# Patient Record
Sex: MALE | Race: BLACK OR AFRICAN AMERICAN | NOT HISPANIC OR LATINO | Employment: FULL TIME | RURAL
[De-identification: names, ages, dates, MRNs, and addresses within clinical notes are randomized per-mention and may not be internally consistent; named-entity substitution may affect disease eponyms.]

---

## 2020-10-01 ENCOUNTER — HISTORICAL (OUTPATIENT)
Dept: ADMINISTRATIVE | Facility: HOSPITAL | Age: 65
End: 2020-10-01

## 2020-10-02 LAB

## 2021-05-12 ENCOUNTER — TELEPHONE (OUTPATIENT)
Dept: PRIMARY CARE CLINIC | Facility: CLINIC | Age: 66
End: 2021-05-12

## 2021-05-12 RX ORDER — AMLODIPINE BESYLATE 5 MG/1
5 TABLET ORAL DAILY
Qty: 90 TABLET | Refills: 3 | Status: SHIPPED | OUTPATIENT
Start: 2021-05-12 | End: 2022-04-19

## 2021-05-12 RX ORDER — AMLODIPINE BESYLATE 5 MG/1
5 TABLET ORAL DAILY
COMMUNITY
End: 2021-05-12 | Stop reason: SDUPTHER

## 2021-05-14 ENCOUNTER — OFFICE VISIT (OUTPATIENT)
Dept: PRIMARY CARE CLINIC | Facility: CLINIC | Age: 66
End: 2021-05-14
Payer: COMMERCIAL

## 2021-05-14 VITALS
WEIGHT: 168 LBS | DIASTOLIC BLOOD PRESSURE: 100 MMHG | TEMPERATURE: 98 F | SYSTOLIC BLOOD PRESSURE: 160 MMHG | BODY MASS INDEX: 25.46 KG/M2 | OXYGEN SATURATION: 100 % | RESPIRATION RATE: 16 BRPM | HEIGHT: 68 IN | HEART RATE: 87 BPM

## 2021-05-14 DIAGNOSIS — M79.642 HAND PAIN, LEFT: ICD-10-CM

## 2021-05-14 DIAGNOSIS — I10 ESSENTIAL HYPERTENSION: Primary | ICD-10-CM

## 2021-05-14 PROCEDURE — 99213 OFFICE O/P EST LOW 20 MIN: CPT | Mod: ,,, | Performed by: NURSE PRACTITIONER

## 2021-05-14 PROCEDURE — 99213 PR OFFICE/OUTPT VISIT, EST, LEVL III, 20-29 MIN: ICD-10-PCS | Mod: ,,, | Performed by: NURSE PRACTITIONER

## 2021-05-14 RX ORDER — CETIRIZINE HYDROCHLORIDE 10 MG/1
10 TABLET ORAL DAILY
COMMUNITY
End: 2023-03-07 | Stop reason: SDUPTHER

## 2021-05-14 RX ORDER — DICLOFENAC SODIUM 10 MG/G
2 GEL TOPICAL 3 TIMES DAILY PRN
Qty: 100 G | Refills: 3 | Status: SHIPPED | OUTPATIENT
Start: 2021-05-14 | End: 2023-03-07 | Stop reason: SDUPTHER

## 2021-05-18 PROBLEM — M79.642 HAND PAIN, LEFT: Status: ACTIVE | Noted: 2021-05-18

## 2023-03-07 ENCOUNTER — OFFICE VISIT (OUTPATIENT)
Dept: INTERNAL MEDICINE | Facility: CLINIC | Age: 68
End: 2023-03-07
Payer: COMMERCIAL

## 2023-03-07 VITALS
BODY MASS INDEX: 24.86 KG/M2 | DIASTOLIC BLOOD PRESSURE: 90 MMHG | HEIGHT: 68 IN | HEART RATE: 102 BPM | WEIGHT: 164 LBS | TEMPERATURE: 98 F | SYSTOLIC BLOOD PRESSURE: 138 MMHG | OXYGEN SATURATION: 97 % | RESPIRATION RATE: 16 BRPM

## 2023-03-07 DIAGNOSIS — M15.9 OSTEOARTHRITIS OF MULTIPLE JOINTS, UNSPECIFIED OSTEOARTHRITIS TYPE: ICD-10-CM

## 2023-03-07 DIAGNOSIS — I10 ESSENTIAL HYPERTENSION: Primary | ICD-10-CM

## 2023-03-07 DIAGNOSIS — J30.2 SEASONAL ALLERGIES: ICD-10-CM

## 2023-03-07 PROCEDURE — 99203 OFFICE O/P NEW LOW 30 MIN: CPT | Mod: S$PBB,,, | Performed by: INTERNAL MEDICINE

## 2023-03-07 PROCEDURE — 99203 PR OFFICE/OUTPT VISIT, NEW, LEVL III, 30-44 MIN: ICD-10-PCS | Mod: S$PBB,,, | Performed by: INTERNAL MEDICINE

## 2023-03-07 PROCEDURE — 99214 OFFICE O/P EST MOD 30 MIN: CPT | Mod: PBBFAC | Performed by: INTERNAL MEDICINE

## 2023-03-07 RX ORDER — MELOXICAM 15 MG/1
15 TABLET ORAL DAILY
Qty: 90 TABLET | Refills: 3 | Status: SHIPPED | OUTPATIENT
Start: 2023-03-07 | End: 2024-02-23

## 2023-03-07 RX ORDER — CETIRIZINE HYDROCHLORIDE 10 MG/1
10 TABLET ORAL DAILY
Qty: 90 TABLET | Refills: 3 | Status: SHIPPED | OUTPATIENT
Start: 2023-03-07

## 2023-03-07 RX ORDER — AMLODIPINE BESYLATE 5 MG/1
5 TABLET ORAL DAILY
Qty: 90 TABLET | Refills: 3 | Status: SHIPPED | OUTPATIENT
Start: 2023-03-07 | End: 2024-02-23

## 2023-03-07 RX ORDER — DICLOFENAC SODIUM 10 MG/G
2 GEL TOPICAL 3 TIMES DAILY PRN
Qty: 100 G | Refills: 3 | Status: SHIPPED | OUTPATIENT
Start: 2023-03-07

## 2023-03-07 NOTE — PROGRESS NOTES
Subjective:       Patient ID: Isidoro Martinez is a 67 y.o. male.    Chief Complaint: Nasal Congestion (allergies) and Establish Care    Needs new pcp  doing well  seasonal allergies  Review of Systems   Constitutional:  Negative for appetite change, fatigue and unexpected weight change.   HENT:  Negative for postnasal drip, trouble swallowing and goiter.    Eyes:  Negative for visual disturbance.   Respiratory:  Negative for apnea, choking and chest tightness.    Cardiovascular:  Negative for chest pain and leg swelling.   Gastrointestinal:  Negative for blood in stool, change in bowel habit and change in bowel habit.   Genitourinary:  Negative for difficulty urinating and frequency.   Musculoskeletal:  Negative for arthralgias, back pain and leg pain.   Integumentary:  Negative for rash.   Neurological:  Negative for coordination difficulties, memory loss and coordination difficulties.   Hematological:  Negative for adenopathy.   Psychiatric/Behavioral:  Negative for agitation. The patient is not hyperactive.        Objective:      Physical Exam  Vitals and nursing note reviewed.   Constitutional:       Appearance: Normal appearance. He is obese.   HENT:      Head: Normocephalic and atraumatic.      Right Ear: Tympanic membrane, ear canal and external ear normal.      Left Ear: Tympanic membrane, ear canal and external ear normal.      Nose: Nose normal.      Mouth/Throat:      Mouth: Mucous membranes are moist.      Pharynx: Oropharynx is clear.   Eyes:      Extraocular Movements: Extraocular movements intact.      Conjunctiva/sclera: Conjunctivae normal.      Pupils: Pupils are equal, round, and reactive to light.   Cardiovascular:      Rate and Rhythm: Normal rate and regular rhythm.      Pulses: Normal pulses.      Heart sounds: Normal heart sounds.   Pulmonary:      Effort: Pulmonary effort is normal.      Breath sounds: Normal breath sounds.   Abdominal:      General: Abdomen is flat. Bowel sounds are normal.       Palpations: Abdomen is soft.   Musculoskeletal:         General: Normal range of motion.      Cervical back: Normal range of motion.   Skin:     General: Skin is warm and dry.      Capillary Refill: Capillary refill takes less than 2 seconds.   Neurological:      General: No focal deficit present.      Mental Status: He is alert and oriented to person, place, and time.   Psychiatric:         Mood and Affect: Mood normal.         Behavior: Behavior normal.         Thought Content: Thought content normal.         Judgment: Judgment normal.       Assessment:       1. Essential hypertension    2. Osteoarthritis of multiple joints, unspecified osteoarthritis type    3. Seasonal allergies          Plan:         Patient Instructions   Continue current meds and f/u 3 months      Problem List Items Addressed This Visit    None  Visit Diagnoses       Essential hypertension    -  Primary    Osteoarthritis of multiple joints, unspecified osteoarthritis type        Seasonal allergies

## 2023-05-09 DIAGNOSIS — Z71.89 COMPLEX CARE COORDINATION: ICD-10-CM

## 2023-12-09 DIAGNOSIS — Z71.89 COMPLEX CARE COORDINATION: ICD-10-CM

## 2024-02-22 DIAGNOSIS — I10 ESSENTIAL HYPERTENSION: ICD-10-CM

## 2024-02-22 DIAGNOSIS — M15.9 OSTEOARTHRITIS OF MULTIPLE JOINTS, UNSPECIFIED OSTEOARTHRITIS TYPE: ICD-10-CM

## 2024-02-23 RX ORDER — MELOXICAM 15 MG/1
15 TABLET ORAL
Qty: 90 TABLET | Refills: 3 | Status: SHIPPED | OUTPATIENT
Start: 2024-02-23

## 2024-02-23 RX ORDER — AMLODIPINE BESYLATE 5 MG/1
5 TABLET ORAL
Qty: 90 TABLET | Refills: 3 | Status: SHIPPED | OUTPATIENT
Start: 2024-02-23

## 2024-11-26 ENCOUNTER — HOSPITAL ENCOUNTER (OUTPATIENT)
Dept: CARDIOLOGY | Facility: HOSPITAL | Age: 69
Discharge: HOME | End: 2024-11-26
Payer: MEDICARE

## 2024-11-26 DIAGNOSIS — I10 ESSENTIAL HYPERTENSION, MALIGNANT: ICD-10-CM

## 2024-11-26 DIAGNOSIS — I10 ESSENTIAL HYPERTENSION, MALIGNANT: Primary | ICD-10-CM

## 2024-11-26 PROCEDURE — 93005 ELECTROCARDIOGRAM TRACING: CPT

## 2024-12-18 ENCOUNTER — HOSPITAL ENCOUNTER (INPATIENT)
Facility: HOSPITAL | Age: 69
LOS: 5 days | Discharge: HOME | DRG: 871 | End: 2024-12-23
Attending: EMERGENCY MEDICINE | Admitting: INTERNAL MEDICINE
Payer: MEDICARE

## 2024-12-18 ENCOUNTER — OFFICE VISIT (OUTPATIENT)
Dept: FAMILY MEDICINE | Facility: CLINIC | Age: 69
End: 2024-12-18
Payer: MEDICARE

## 2024-12-18 VITALS
RESPIRATION RATE: 22 BRPM | WEIGHT: 168 LBS | HEIGHT: 68 IN | OXYGEN SATURATION: 96 % | BODY MASS INDEX: 25.46 KG/M2 | SYSTOLIC BLOOD PRESSURE: 131 MMHG | DIASTOLIC BLOOD PRESSURE: 93 MMHG | HEART RATE: 116 BPM | TEMPERATURE: 97 F

## 2024-12-18 DIAGNOSIS — J18.9 PNEUMONIA OF LEFT LUNG DUE TO INFECTIOUS ORGANISM, UNSPECIFIED PART OF LUNG: ICD-10-CM

## 2024-12-18 DIAGNOSIS — R05.9 COUGH, UNSPECIFIED TYPE: ICD-10-CM

## 2024-12-18 DIAGNOSIS — J90 PLEURAL EFFUSION: Primary | ICD-10-CM

## 2024-12-18 DIAGNOSIS — R07.9 CHEST PAIN: ICD-10-CM

## 2024-12-18 DIAGNOSIS — R06.02 SHORTNESS OF BREATH: ICD-10-CM

## 2024-12-18 PROBLEM — F10.20 ALCOHOL DEPENDENCY: Status: ACTIVE | Noted: 2024-12-18

## 2024-12-18 LAB
ALBUMIN SERPL BCP-MCNC: 3.2 G/DL (ref 3.4–4.8)
ALBUMIN/GLOB SERPL: 0.7 {RATIO}
ALP SERPL-CCNC: 75 U/L (ref 40–150)
ALT SERPL W P-5'-P-CCNC: 27 U/L
ANION GAP SERPL CALCULATED.3IONS-SCNC: 18 MMOL/L (ref 7–16)
AST SERPL W P-5'-P-CCNC: 31 U/L (ref 5–34)
BASOPHILS # BLD AUTO: 0.04 K/UL (ref 0–0.2)
BASOPHILS NFR BLD AUTO: 0.2 % (ref 0–1)
BILIRUB SERPL-MCNC: 0.4 MG/DL
BUN SERPL-MCNC: 9 MG/DL (ref 8–26)
BUN/CREAT SERPL: 11 (ref 6–20)
CALCIUM SERPL-MCNC: 9.4 MG/DL (ref 8.8–10)
CHLORIDE SERPL-SCNC: 102 MMOL/L (ref 98–107)
CO2 SERPL-SCNC: 17 MMOL/L (ref 23–31)
CREAT SERPL-MCNC: 0.79 MG/DL (ref 0.72–1.25)
DIFFERENTIAL METHOD BLD: ABNORMAL
EGFR (NO RACE VARIABLE) (RUSH/TITUS): 97 ML/MIN/1.73M2
EOSINOPHIL # BLD AUTO: 0.07 K/UL (ref 0–0.5)
EOSINOPHIL NFR BLD AUTO: 0.4 % (ref 1–4)
ERYTHROCYTE [DISTWIDTH] IN BLOOD BY AUTOMATED COUNT: 12.9 % (ref 11.5–14.5)
ETHANOL SERPL-MCNC: <10 MG/DL
GLOBULIN SER-MCNC: 4.7 G/DL (ref 2–4)
GLUCOSE SERPL-MCNC: 154 MG/DL (ref 82–115)
HCT VFR BLD AUTO: 50.1 % (ref 40–54)
HGB BLD-MCNC: 16.5 G/DL (ref 13.5–18)
IMM GRANULOCYTES # BLD AUTO: 0.15 K/UL (ref 0–0.04)
IMM GRANULOCYTES NFR BLD: 0.9 % (ref 0–0.4)
LYMPHOCYTES # BLD AUTO: 1.69 K/UL (ref 1–4.8)
LYMPHOCYTES NFR BLD AUTO: 10.1 % (ref 27–41)
MCH RBC QN AUTO: 30.2 PG (ref 27–31)
MCHC RBC AUTO-ENTMCNC: 32.9 G/DL (ref 32–36)
MCV RBC AUTO: 91.6 FL (ref 80–96)
MONOCYTES # BLD AUTO: 2.18 K/UL (ref 0–0.8)
MONOCYTES NFR BLD AUTO: 13 % (ref 2–6)
MPC BLD CALC-MCNC: 9.1 FL (ref 9.4–12.4)
NEUTROPHILS # BLD AUTO: 12.61 K/UL (ref 1.8–7.7)
NEUTROPHILS NFR BLD AUTO: 75.4 % (ref 53–65)
NRBC # BLD AUTO: 0 X10E3/UL
NRBC, AUTO (.00): 0 %
NT-PROBNP SERPL-MCNC: 198 PG/ML (ref 1–125)
PLATELET # BLD AUTO: 524 K/UL (ref 150–400)
POTASSIUM SERPL-SCNC: 4.2 MMOL/L (ref 3.5–5.1)
PROT SERPL-MCNC: 7.9 G/DL (ref 5.8–7.6)
RBC # BLD AUTO: 5.47 M/UL (ref 4.6–6.2)
SODIUM SERPL-SCNC: 133 MMOL/L (ref 136–145)
TROPONIN I SERPL HS-MCNC: <2.7 NG/L
TSH SERPL DL<=0.005 MIU/L-ACNC: 1.32 UIU/ML (ref 0.35–4.94)
WBC # BLD AUTO: 16.74 K/UL (ref 4.5–11)

## 2024-12-18 PROCEDURE — 36415 COLL VENOUS BLD VENIPUNCTURE: CPT | Performed by: EMERGENCY MEDICINE

## 2024-12-18 PROCEDURE — 3008F BODY MASS INDEX DOCD: CPT | Mod: ,,, | Performed by: FAMILY MEDICINE

## 2024-12-18 PROCEDURE — 83880 ASSAY OF NATRIURETIC PEPTIDE: CPT | Performed by: EMERGENCY MEDICINE

## 2024-12-18 PROCEDURE — 96372 THER/PROPH/DIAG INJ SC/IM: CPT | Mod: ,,, | Performed by: FAMILY MEDICINE

## 2024-12-18 PROCEDURE — 25000003 PHARM REV CODE 250: Performed by: NURSE PRACTITIONER

## 2024-12-18 PROCEDURE — 93005 ELECTROCARDIOGRAM TRACING: CPT

## 2024-12-18 PROCEDURE — 82077 ASSAY SPEC XCP UR&BREATH IA: CPT | Performed by: NURSE PRACTITIONER

## 2024-12-18 PROCEDURE — 11000001 HC ACUTE MED/SURG PRIVATE ROOM

## 2024-12-18 PROCEDURE — 80053 COMPREHEN METABOLIC PANEL: CPT | Performed by: EMERGENCY MEDICINE

## 2024-12-18 PROCEDURE — 99213 OFFICE O/P EST LOW 20 MIN: CPT | Mod: 25,,, | Performed by: FAMILY MEDICINE

## 2024-12-18 PROCEDURE — 25500020 PHARM REV CODE 255: Performed by: INTERNAL MEDICINE

## 2024-12-18 PROCEDURE — 85025 COMPLETE CBC W/AUTO DIFF WBC: CPT | Performed by: EMERGENCY MEDICINE

## 2024-12-18 PROCEDURE — 3288F FALL RISK ASSESSMENT DOCD: CPT | Mod: ,,, | Performed by: FAMILY MEDICINE

## 2024-12-18 PROCEDURE — 63600175 PHARM REV CODE 636 W HCPCS: Performed by: INTERNAL MEDICINE

## 2024-12-18 PROCEDURE — 1101F PT FALLS ASSESS-DOCD LE1/YR: CPT | Mod: ,,, | Performed by: FAMILY MEDICINE

## 2024-12-18 PROCEDURE — 36415 COLL VENOUS BLD VENIPUNCTURE: CPT | Performed by: NURSE PRACTITIONER

## 2024-12-18 PROCEDURE — 93010 ELECTROCARDIOGRAM REPORT: CPT | Mod: ,,, | Performed by: STUDENT IN AN ORGANIZED HEALTH CARE EDUCATION/TRAINING PROGRAM

## 2024-12-18 PROCEDURE — 3044F HG A1C LEVEL LT 7.0%: CPT | Mod: ,,, | Performed by: FAMILY MEDICINE

## 2024-12-18 PROCEDURE — 25000242 PHARM REV CODE 250 ALT 637 W/ HCPCS: Performed by: NURSE PRACTITIONER

## 2024-12-18 PROCEDURE — 25000003 PHARM REV CODE 250: Performed by: INTERNAL MEDICINE

## 2024-12-18 PROCEDURE — 3075F SYST BP GE 130 - 139MM HG: CPT | Mod: ,,, | Performed by: FAMILY MEDICINE

## 2024-12-18 PROCEDURE — 1159F MED LIST DOCD IN RCRD: CPT | Mod: ,,, | Performed by: FAMILY MEDICINE

## 2024-12-18 PROCEDURE — 3080F DIAST BP >= 90 MM HG: CPT | Mod: ,,, | Performed by: FAMILY MEDICINE

## 2024-12-18 PROCEDURE — 1160F RVW MEDS BY RX/DR IN RCRD: CPT | Mod: ,,, | Performed by: FAMILY MEDICINE

## 2024-12-18 PROCEDURE — 84484 ASSAY OF TROPONIN QUANT: CPT | Performed by: EMERGENCY MEDICINE

## 2024-12-18 PROCEDURE — 63600175 PHARM REV CODE 636 W HCPCS: Performed by: NURSE PRACTITIONER

## 2024-12-18 PROCEDURE — 99285 EMERGENCY DEPT VISIT HI MDM: CPT | Mod: 25

## 2024-12-18 RX ORDER — AMLODIPINE BESYLATE 10 MG/1
10 TABLET ORAL DAILY
Status: DISCONTINUED | OUTPATIENT
Start: 2024-12-19 | End: 2024-12-21

## 2024-12-18 RX ORDER — IOPAMIDOL 755 MG/ML
100 INJECTION, SOLUTION INTRAVASCULAR
Status: COMPLETED | OUTPATIENT
Start: 2024-12-18 | End: 2024-12-18

## 2024-12-18 RX ORDER — CEFTRIAXONE 500 MG/1
500 INJECTION, POWDER, FOR SOLUTION INTRAMUSCULAR; INTRAVENOUS
Status: DISCONTINUED | OUTPATIENT
Start: 2024-12-18 | End: 2024-12-18 | Stop reason: HOSPADM

## 2024-12-18 RX ORDER — ACETAMINOPHEN 325 MG/1
650 TABLET ORAL EVERY 4 HOURS PRN
Status: DISCONTINUED | OUTPATIENT
Start: 2024-12-18 | End: 2024-12-19

## 2024-12-18 RX ORDER — AMOXICILLIN 250 MG
1 CAPSULE ORAL 2 TIMES DAILY
Status: DISCONTINUED | OUTPATIENT
Start: 2024-12-18 | End: 2024-12-23 | Stop reason: HOSPADM

## 2024-12-18 RX ORDER — ONDANSETRON HYDROCHLORIDE 2 MG/ML
4 INJECTION, SOLUTION INTRAVENOUS EVERY 8 HOURS PRN
Status: DISCONTINUED | OUTPATIENT
Start: 2024-12-18 | End: 2024-12-19

## 2024-12-18 RX ORDER — LORAZEPAM 2 MG/ML
2 INJECTION INTRAMUSCULAR
Status: DISCONTINUED | OUTPATIENT
Start: 2024-12-18 | End: 2024-12-23 | Stop reason: HOSPADM

## 2024-12-18 RX ORDER — IBUPROFEN 200 MG
16 TABLET ORAL
Status: DISCONTINUED | OUTPATIENT
Start: 2024-12-18 | End: 2024-12-19

## 2024-12-18 RX ORDER — HYDROCODONE BITARTRATE AND ACETAMINOPHEN 5; 325 MG/1; MG/1
1 TABLET ORAL EVERY 6 HOURS PRN
Status: DISCONTINUED | OUTPATIENT
Start: 2024-12-18 | End: 2024-12-23 | Stop reason: HOSPADM

## 2024-12-18 RX ORDER — POLYETHYLENE GLYCOL 3350 17 G/17G
17 POWDER, FOR SOLUTION ORAL DAILY
Status: DISCONTINUED | OUTPATIENT
Start: 2024-12-19 | End: 2024-12-23 | Stop reason: HOSPADM

## 2024-12-18 RX ORDER — IBUPROFEN 200 MG
24 TABLET ORAL
Status: DISCONTINUED | OUTPATIENT
Start: 2024-12-18 | End: 2024-12-19

## 2024-12-18 RX ORDER — AMLODIPINE BESYLATE 10 MG/1
10 TABLET ORAL DAILY
COMMUNITY
Start: 2024-11-22

## 2024-12-18 RX ORDER — MORPHINE SULFATE 4 MG/ML
4 INJECTION, SOLUTION INTRAMUSCULAR; INTRAVENOUS EVERY 4 HOURS PRN
Status: DISCONTINUED | OUTPATIENT
Start: 2024-12-18 | End: 2024-12-23 | Stop reason: HOSPADM

## 2024-12-18 RX ORDER — IPRATROPIUM BROMIDE AND ALBUTEROL SULFATE 2.5; .5 MG/3ML; MG/3ML
3 SOLUTION RESPIRATORY (INHALATION) EVERY 6 HOURS
Status: DISCONTINUED | OUTPATIENT
Start: 2024-12-18 | End: 2024-12-23 | Stop reason: HOSPADM

## 2024-12-18 RX ORDER — DEXAMETHASONE SODIUM PHOSPHATE 4 MG/ML
4 INJECTION, SOLUTION INTRA-ARTICULAR; INTRALESIONAL; INTRAMUSCULAR; INTRAVENOUS; SOFT TISSUE
Status: DISCONTINUED | OUTPATIENT
Start: 2024-12-18 | End: 2024-12-18 | Stop reason: HOSPADM

## 2024-12-18 RX ORDER — GLUCAGON 1 MG
1 KIT INJECTION
Status: DISCONTINUED | OUTPATIENT
Start: 2024-12-18 | End: 2024-12-19

## 2024-12-18 RX ORDER — PROCHLORPERAZINE EDISYLATE 5 MG/ML
5 INJECTION INTRAMUSCULAR; INTRAVENOUS EVERY 6 HOURS PRN
Status: DISCONTINUED | OUTPATIENT
Start: 2024-12-18 | End: 2024-12-23 | Stop reason: HOSPADM

## 2024-12-18 RX ORDER — SODIUM CHLORIDE 0.9 % (FLUSH) 0.9 %
10 SYRINGE (ML) INJECTION EVERY 12 HOURS PRN
Status: DISCONTINUED | OUTPATIENT
Start: 2024-12-18 | End: 2024-12-19

## 2024-12-18 RX ORDER — NALOXONE HCL 0.4 MG/ML
0.02 VIAL (ML) INJECTION
Status: DISCONTINUED | OUTPATIENT
Start: 2024-12-18 | End: 2024-12-23 | Stop reason: HOSPADM

## 2024-12-18 RX ADMIN — DEXAMETHASONE SODIUM PHOSPHATE 4 MG: 4 INJECTION, SOLUTION INTRA-ARTICULAR; INTRALESIONAL; INTRAMUSCULAR; INTRAVENOUS; SOFT TISSUE at 04:12

## 2024-12-18 RX ADMIN — IPRATROPIUM BROMIDE AND ALBUTEROL SULFATE 3 ML: .5; 3 SOLUTION RESPIRATORY (INHALATION) at 07:12

## 2024-12-18 RX ADMIN — CEFTRIAXONE 500 MG: 500 INJECTION, POWDER, FOR SOLUTION INTRAMUSCULAR; INTRAVENOUS at 04:12

## 2024-12-18 RX ADMIN — PIPERACILLIN SODIUM AND TAZOBACTAM SODIUM 4.5 G: 4; .5 INJECTION, POWDER, LYOPHILIZED, FOR SOLUTION INTRAVENOUS at 10:12

## 2024-12-18 RX ADMIN — IOPAMIDOL 90 ML: 755 INJECTION, SOLUTION INTRAVENOUS at 07:12

## 2024-12-18 RX ADMIN — VANCOMYCIN HYDROCHLORIDE 1500 MG: 1 INJECTION, POWDER, LYOPHILIZED, FOR SOLUTION INTRAVENOUS at 11:12

## 2024-12-18 NOTE — ED PROVIDER NOTES
Encounter Date: 12/18/2024       History     Chief Complaint   Patient presents with    Shortness of Breath     Patient presents to ED from Dr. Malone's office with c/o SOB x 3-4 days.  Patient states he was just diagnosed with pneumonia.       Patient is a 69-year-old male who presents to the emergency department complaining of 3 day history of cough with greenish sputum and progressively worsening shortness breath.  Patient was seen in clinic where chest x-ray shows large left pleural effusion so he was sent to the emergency department.  Patient denies fever, chest pain, nausea, vomiting, diarrhea, leg pain or swelling, or other acute problems or complaints.  Patient denies previous history of heart or lung problems.        Review of patient's allergies indicates:  No Known Allergies  Past Medical History:   Diagnosis Date    Hypertension      Past Surgical History:   Procedure Laterality Date    COLONOSCOPY      HERNIA REPAIR      x2     Family History   Problem Relation Name Age of Onset    Diabetes Mother      Stroke Father      Heart attack Father      Diabetes Sister      Diabetes Brother       Social History     Tobacco Use    Smoking status: Every Day     Current packs/day: 1.00     Types: Cigarettes    Smokeless tobacco: Never   Substance Use Topics    Alcohol use: Yes     Comment: socially    Drug use: Never     Review of Systems   Respiratory:  Positive for cough and shortness of breath.    All other systems reviewed and are negative.      Physical Exam     Initial Vitals [12/18/24 1720]   BP Pulse Resp Temp SpO2   124/82 (!) 130 (!) 31 98.5 °F (36.9 °C) (!) 94 %      MAP       --         Physical Exam    Nursing note and vitals reviewed.  HENT:   Head: Normocephalic and atraumatic. Mouth/Throat: Oropharynx is clear and moist.   Eyes: Pupils are equal, round, and reactive to light.   Neck: Neck supple.   Normal range of motion.  Cardiovascular:  Normal rate and regular rhythm.           Pulmonary/Chest:  Effort normal.   Decreased breath sounds on left.   Abdominal: Abdomen is soft. He exhibits no distension.   Musculoskeletal:         General: Normal range of motion.      Cervical back: Normal range of motion and neck supple.     Neurological: He is alert.   Skin: Skin is warm. Capillary refill takes less than 2 seconds.   Psychiatric: He has a normal mood and affect.         Medical Screening Exam   See Full Note    ED Course   Procedures  Labs Reviewed   CBC W/ AUTO DIFFERENTIAL    Narrative:     The following orders were created for panel order CBC auto differential.  Procedure                               Abnormality         Status                     ---------                               -----------         ------                     CBC with Differential[[id number]]                           In process                   Please view results for these tests on the individual orders.   COMPREHENSIVE METABOLIC PANEL   TROPONIN I   NT-PRO NATRIURETIC PEPTIDE   CBC WITH DIFFERENTIAL          Imaging Results              X-Ray Chest AP Portable (Final result)  Result time 12/18/24 17:41:10      Final result by Austin George MD (12/18/24 17:41:10)                   Impression:      As above      Electronically signed by: Austin George MD  Date:    12/18/2024  Time:    17:41               Narrative:    EXAMINATION:  XR CHEST AP PORTABLE    CLINICAL HISTORY:  Dyspnea;    TECHNIQUE:  Single frontal view of the chest was performed.    COMPARISON:  12/18/2024    FINDINGS:  There is complete opacification of the left hemithorax, not significantly changed since the previous examination.  There is no pneumothorax or significant detrimental change in the cardiopulmonary status.                                       Medications - No data to display  Medical Decision Making             ED Course as of 12/18/24 1811   Wed Dec 18, 2024   1714 Medical decision-making:  Differential diagnosis includes pneumonia, pleural  effusion, lung cancer, empyema.  All testing ordered and interpreted by me. [BB]   1717 EKG by my interpretation shows sinus rhythm, rate of 129, no acute ST segment changes. [BB]   1806 I made the decision to admit patient and discussed case with internal medicine hospitalist on-call who agrees with admission. [BB]      ED Course User Index  [BB] Reyes Bingham MD                           Clinical Impression:   Final diagnoses:  [R06.02] Shortness of breath  [J90] Pleural effusion (Primary)  [R05.9] Cough, unspecified type               Reyes Bingham MD  12/18/24 1812

## 2024-12-18 NOTE — Clinical Note
Diagnosis: Pleural effusion [365266]   Future Attending Provider: SAUL ARREAGA [[id number]]   Reason for IP Medical Treatment  (Clinical interventions that can only be accomplished in the IP setting? ) :: Patient requires therapeutic thoracentesis, further labs, imaging, and workup of pleural effusion.   Special Needs:: No Special Needs [1]

## 2024-12-18 NOTE — PROGRESS NOTES
Subjective:       Patient ID: Isidoro Martinez is a 69 y.o. male.    Chief Complaint: Cough, Wheezing, and Shortness of Breath (POST 3 DAYS.  )    Pt with 3 days of sob, wheezing, coughing, no hx of lung issues.  Patient reports significant sob with exertion, mild to moderate sob at rest.     Cough  Associated symptoms include shortness of breath and wheezing. Pertinent negatives include no chest pain, chills, ear pain, eye redness, fever, headaches, myalgias, postnasal drip, rash, rhinorrhea or sore throat. There is no history of environmental allergies.   Wheezing   Associated symptoms include coughing and shortness of breath. Pertinent negatives include no abdominal pain, chest pain, chills, diarrhea, ear pain, fever, headaches, neck pain, rash, rhinorrhea, sore throat or vomiting.   Shortness of Breath  Associated symptoms include wheezing. Pertinent negatives include no abdominal pain, chest pain, ear pain, fever, headaches, leg pain, leg swelling, neck pain, rash, rhinorrhea, sore throat or vomiting.     Review of Systems   Constitutional:  Negative for activity change, appetite change, chills, diaphoresis, fatigue, fever and unexpected weight change.   HENT:  Negative for nasal congestion, dental problem, drooling, ear discharge, ear pain, facial swelling, hearing loss, mouth sores, nosebleeds, postnasal drip, rhinorrhea, sinus pressure/congestion, sneezing, sore throat, tinnitus, trouble swallowing, voice change and goiter.    Eyes:  Negative for photophobia, pain, discharge, redness, itching and visual disturbance.   Respiratory:  Positive for cough, shortness of breath and wheezing. Negative for apnea, choking, chest tightness and stridor.    Cardiovascular:  Negative for chest pain, palpitations, leg swelling and claudication.   Gastrointestinal:  Negative for abdominal distention, abdominal pain, anal bleeding, blood in stool, change in bowel habit, constipation, diarrhea, nausea, vomiting, reflux and  fecal incontinence.   Endocrine: Negative for cold intolerance, heat intolerance, polydipsia, polyphagia and polyuria.   Genitourinary:  Negative for bladder incontinence, decreased urine volume, difficulty urinating, discharge, dysuria, enuresis, erectile dysfunction, flank pain, frequency, genital sores, hematuria, penile pain, testicular pain and urgency.   Musculoskeletal:  Negative for arthralgias, back pain, gait problem, joint swelling, leg pain, myalgias, neck pain, neck stiffness and joint deformity.   Integumentary:  Negative for pallor, rash, wound and mole/lesion.   Allergic/Immunologic: Negative for environmental allergies, food allergies and frequent infections.   Neurological:  Negative for dizziness, vertigo, tremors, seizures, syncope, facial asymmetry, speech difficulty, weakness, light-headedness, numbness, headaches, memory loss and coordination difficulties.   Hematological:  Negative for adenopathy. Does not bruise/bleed easily.   Psychiatric/Behavioral:  Negative for agitation, behavioral problems, confusion, decreased concentration, dysphoric mood, hallucinations, self-injury, sleep disturbance and suicidal ideas. The patient is not nervous/anxious and is not hyperactive.          Objective:      Physical Exam  Vitals reviewed.   Constitutional:       Appearance: Normal appearance. He is normal weight.   HENT:      Head: Normocephalic and atraumatic.      Right Ear: Tympanic membrane and ear canal normal.      Left Ear: Tympanic membrane, ear canal and external ear normal.      Nose: Nose normal.      Mouth/Throat:      Mouth: Mucous membranes are moist.      Pharynx: Oropharynx is clear.   Eyes:      Extraocular Movements: Extraocular movements intact.      Conjunctiva/sclera: Conjunctivae normal.      Pupils: Pupils are equal, round, and reactive to light.   Cardiovascular:      Rate and Rhythm: Normal rate and regular rhythm.      Pulses: Normal pulses.      Heart sounds: Normal heart  sounds.   Pulmonary:      Effort: Pulmonary effort is normal.      Breath sounds: Wheezing, rhonchi and rales present.      Comments: Right lung wheezing and rhonchi moderate. Left lung no sounds.   Abdominal:      General: Abdomen is flat. Bowel sounds are normal.      Palpations: Abdomen is soft.   Musculoskeletal:         General: Normal range of motion.      Cervical back: Normal range of motion and neck supple.   Skin:     General: Skin is warm and dry.   Neurological:      General: No focal deficit present.      Mental Status: He is alert and oriented to person, place, and time. Mental status is at baseline.   Psychiatric:         Mood and Affect: Mood normal.         Behavior: Behavior normal.         Thought Content: Thought content normal.         Judgment: Judgment normal.         Assessment:       1. Pleural effusion    2. Cough, unspecified type    3. Pneumonia of left lung due to infectious organism, unspecified part of lung        Plan:     Pleural effusion    Cough, unspecified type  -     X-Ray Chest PA And Lateral; Future; Expected date: 12/18/2024    Pneumonia of left lung due to infectious organism, unspecified part of lung  -     cefTRIAXone injection 500 mg  -     dexAMETHasone injection 4 mg       Large pleural effusion and pneumonia on cxr, sending to ED due to severity, will give abx and steroids before sending, have notified [facility], patient has friend transporting him to ED.

## 2024-12-19 PROBLEM — E87.20 ACIDOSIS: Status: ACTIVE | Noted: 2024-12-19

## 2024-12-19 PROBLEM — A41.9 SEPSIS DUE TO PNEUMONIA: Status: ACTIVE | Noted: 2024-12-19

## 2024-12-19 PROBLEM — Z72.0 TOBACCO ABUSE: Status: ACTIVE | Noted: 2024-12-19

## 2024-12-19 PROBLEM — J18.9 SEPSIS DUE TO PNEUMONIA: Status: ACTIVE | Noted: 2024-12-19

## 2024-12-19 LAB
ALBUMIN FLD-MCNC: 2.7 G/DL
ALBUMIN SERPL BCP-MCNC: 3 G/DL (ref 3.4–4.8)
ALBUMIN/GLOB SERPL: 0.7 {RATIO}
ALP SERPL-CCNC: 67 U/L (ref 40–150)
ALT SERPL W P-5'-P-CCNC: 30 U/L
ANION GAP SERPL CALCULATED.3IONS-SCNC: 17 MMOL/L (ref 7–16)
APTT PPP: 26.9 SECONDS (ref 25.2–37.3)
AST SERPL W P-5'-P-CCNC: 28 U/L (ref 5–34)
BASOPHILS # BLD AUTO: 0.03 K/UL (ref 0–0.2)
BASOPHILS NFR BLD AUTO: 0.2 % (ref 0–1)
BILIRUB SERPL-MCNC: 0.4 MG/DL
BILIRUB UR QL STRIP: NEGATIVE
BUN SERPL-MCNC: 10 MG/DL (ref 8–26)
BUN/CREAT SERPL: 12 (ref 6–20)
CALCIUM SERPL-MCNC: 9 MG/DL (ref 8.8–10)
CHLORIDE SERPL-SCNC: 101 MMOL/L (ref 98–107)
CHOLEST FLD-MCNC: 108 MG/DL
CLARITY FLD: ABNORMAL
CLARITY UR: ABNORMAL
CO2 SERPL-SCNC: 20 MMOL/L (ref 23–31)
COLOR FLD: ABNORMAL
COLOR UR: ABNORMAL
CREAT SERPL-MCNC: 0.85 MG/DL (ref 0.72–1.25)
DIFFERENTIAL METHOD BLD: ABNORMAL
EGFR (NO RACE VARIABLE) (RUSH/TITUS): 95 ML/MIN/1.73M2
EOSINOPHIL # BLD AUTO: 0.01 K/UL (ref 0–0.5)
EOSINOPHIL NFR BLD AUTO: 0.1 % (ref 1–4)
EOSINOPHIL NFR FLD MANUAL: 6 %
ERYTHROCYTE [DISTWIDTH] IN BLOOD BY AUTOMATED COUNT: 12.7 % (ref 11.5–14.5)
EST. AVERAGE GLUCOSE BLD GHB EST-MCNC: 126 MG/DL
GLOBULIN SER-MCNC: 4.2 G/DL (ref 2–4)
GLUCOSE FLD-MCNC: 82 MG/DL
GLUCOSE SERPL-MCNC: 152 MG/DL (ref 70–105)
GLUCOSE SERPL-MCNC: 157 MG/DL (ref 82–115)
GLUCOSE UR STRIP-MCNC: 30 MG/DL
GRANULOCYTES NFR FLD MANUAL: 2 % (ref 0–25)
HBA1C MFR BLD HPLC: 6 %
HCT VFR BLD AUTO: 47.2 % (ref 40–54)
HGB BLD-MCNC: 15.6 G/DL (ref 13.5–18)
IMM GRANULOCYTES # BLD AUTO: 0.23 K/UL (ref 0–0.04)
IMM GRANULOCYTES NFR BLD: 1.4 % (ref 0–0.4)
INR BLD: 1.05
KETONES UR STRIP-SCNC: 10 MG/DL
LACTATE SERPL-SCNC: 1.5 MMOL/L (ref 0.5–2.2)
LDH FLD-CCNC: 1150 U/L
LEUKOCYTE ESTERASE UR QL STRIP: NEGATIVE
LYMPHOCYTES # BLD AUTO: 0.97 K/UL (ref 1–4.8)
LYMPHOCYTES NFR BLD AUTO: 5.7 % (ref 27–41)
LYMPHOCYTES NFR FLD MANUAL: 25 %
MCH RBC QN AUTO: 30.2 PG (ref 27–31)
MCHC RBC AUTO-ENTMCNC: 33.1 G/DL (ref 32–36)
MCV RBC AUTO: 91.3 FL (ref 80–96)
MONOCYTES # BLD AUTO: 0.98 K/UL (ref 0–0.8)
MONOCYTES NFR BLD AUTO: 5.8 % (ref 2–6)
MONOCYTES NFR FLD MANUAL: 67 %
MPC BLD CALC-MCNC: 9 FL (ref 9.4–12.4)
NEUTROPHILS # BLD AUTO: 14.75 K/UL (ref 1.8–7.7)
NEUTROPHILS NFR BLD AUTO: 86.8 % (ref 53–65)
NITRITE UR QL STRIP: NEGATIVE
NRBC # BLD AUTO: 0 X10E3/UL
NRBC, AUTO (.00): 0 %
OHS QRS DURATION: 80 MS
OHS QTC CALCULATION: 407 MS
PH UR STRIP: 5.5 PH UNITS
PLATELET # BLD AUTO: 473 K/UL (ref 150–400)
POTASSIUM SERPL-SCNC: 4.6 MMOL/L (ref 3.5–5.1)
PROT FLD-MCNC: 4.8 G/DL
PROT SERPL-MCNC: 7.2 G/DL (ref 5.8–7.6)
PROT UR QL STRIP: 20
PROTHROMBIN TIME: 13.6 SECONDS (ref 11.7–14.7)
RBC # BLD AUTO: 5.17 M/UL (ref 4.6–6.2)
RBC # FLD MANUAL: ABNORMAL /CUMM
RBC # UR STRIP: NEGATIVE /UL
SODIUM SERPL-SCNC: 133 MMOL/L (ref 136–145)
SP GR UR STRIP: 1.05
UROBILINOGEN UR STRIP-ACNC: NORMAL MG/DL
WBC # BLD AUTO: 16.97 K/UL (ref 4.5–11)
WBC # FLD MANUAL: 1081 /CUMM

## 2024-12-19 PROCEDURE — 88341 IMHCHEM/IMCYTCHM EA ADD ANTB: CPT | Mod: 26,,, | Performed by: PATHOLOGY

## 2024-12-19 PROCEDURE — 94640 AIRWAY INHALATION TREATMENT: CPT

## 2024-12-19 PROCEDURE — 36415 COLL VENOUS BLD VENIPUNCTURE: CPT | Performed by: RADIOLOGY

## 2024-12-19 PROCEDURE — 36415 COLL VENOUS BLD VENIPUNCTURE: CPT | Performed by: NURSE PRACTITIONER

## 2024-12-19 PROCEDURE — 88305 TISSUE EXAM BY PATHOLOGIST: CPT | Mod: 26,,, | Performed by: PATHOLOGY

## 2024-12-19 PROCEDURE — 94799 UNLISTED PULMONARY SVC/PX: CPT

## 2024-12-19 PROCEDURE — 27000221 HC OXYGEN, UP TO 24 HOURS

## 2024-12-19 PROCEDURE — 87070 CULTURE OTHR SPECIMN AEROBIC: CPT | Performed by: STUDENT IN AN ORGANIZED HEALTH CARE EDUCATION/TRAINING PROGRAM

## 2024-12-19 PROCEDURE — 36415 COLL VENOUS BLD VENIPUNCTURE: CPT | Performed by: INTERNAL MEDICINE

## 2024-12-19 PROCEDURE — 88341 IMHCHEM/IMCYTCHM EA ADD ANTB: CPT | Mod: TC,MCY | Performed by: INTERNAL MEDICINE

## 2024-12-19 PROCEDURE — 88112 CYTOPATH CELL ENHANCE TECH: CPT | Mod: 26,,, | Performed by: PATHOLOGY

## 2024-12-19 PROCEDURE — 82042 OTHER SOURCE ALBUMIN QUAN EA: CPT | Performed by: STUDENT IN AN ORGANIZED HEALTH CARE EDUCATION/TRAINING PROGRAM

## 2024-12-19 PROCEDURE — 82962 GLUCOSE BLOOD TEST: CPT

## 2024-12-19 PROCEDURE — 80053 COMPREHEN METABOLIC PANEL: CPT | Performed by: NURSE PRACTITIONER

## 2024-12-19 PROCEDURE — 83036 HEMOGLOBIN GLYCOSYLATED A1C: CPT | Performed by: NURSE PRACTITIONER

## 2024-12-19 PROCEDURE — 85025 COMPLETE CBC W/AUTO DIFF WBC: CPT | Performed by: NURSE PRACTITIONER

## 2024-12-19 PROCEDURE — 63600175 PHARM REV CODE 636 W HCPCS: Performed by: INTERNAL MEDICINE

## 2024-12-19 PROCEDURE — 85730 THROMBOPLASTIN TIME PARTIAL: CPT | Performed by: RADIOLOGY

## 2024-12-19 PROCEDURE — 99900035 HC TECH TIME PER 15 MIN (STAT)

## 2024-12-19 PROCEDURE — 94761 N-INVAS EAR/PLS OXIMETRY MLT: CPT

## 2024-12-19 PROCEDURE — 63600175 PHARM REV CODE 636 W HCPCS: Performed by: NURSE PRACTITIONER

## 2024-12-19 PROCEDURE — 88342 IMHCHEM/IMCYTCHM 1ST ANTB: CPT | Mod: 26,,, | Performed by: PATHOLOGY

## 2024-12-19 PROCEDURE — 32551 INSERTION OF CHEST TUBE: CPT

## 2024-12-19 PROCEDURE — 25000003 PHARM REV CODE 250: Performed by: NURSE PRACTITIONER

## 2024-12-19 PROCEDURE — 89051 BODY FLUID CELL COUNT: CPT | Performed by: STUDENT IN AN ORGANIZED HEALTH CARE EDUCATION/TRAINING PROGRAM

## 2024-12-19 PROCEDURE — 11000001 HC ACUTE MED/SURG PRIVATE ROOM

## 2024-12-19 PROCEDURE — 84157 ASSAY OF PROTEIN OTHER: CPT | Performed by: STUDENT IN AN ORGANIZED HEALTH CARE EDUCATION/TRAINING PROGRAM

## 2024-12-19 PROCEDURE — 0W9B30Z DRAINAGE OF LEFT PLEURAL CAVITY WITH DRAINAGE DEVICE, PERCUTANEOUS APPROACH: ICD-10-PCS | Performed by: STUDENT IN AN ORGANIZED HEALTH CARE EDUCATION/TRAINING PROGRAM

## 2024-12-19 PROCEDURE — 25000003 PHARM REV CODE 250: Performed by: INTERNAL MEDICINE

## 2024-12-19 PROCEDURE — 99223 1ST HOSP IP/OBS HIGH 75: CPT | Mod: ,,, | Performed by: STUDENT IN AN ORGANIZED HEALTH CARE EDUCATION/TRAINING PROGRAM

## 2024-12-19 PROCEDURE — 81003 URINALYSIS AUTO W/O SCOPE: CPT | Performed by: INTERNAL MEDICINE

## 2024-12-19 PROCEDURE — 87641 MR-STAPH DNA AMP PROBE: CPT | Performed by: STUDENT IN AN ORGANIZED HEALTH CARE EDUCATION/TRAINING PROGRAM

## 2024-12-19 PROCEDURE — 99233 SBSQ HOSP IP/OBS HIGH 50: CPT | Mod: ,,, | Performed by: STUDENT IN AN ORGANIZED HEALTH CARE EDUCATION/TRAINING PROGRAM

## 2024-12-19 PROCEDURE — 87040 BLOOD CULTURE FOR BACTERIA: CPT | Performed by: INTERNAL MEDICINE

## 2024-12-19 PROCEDURE — 83605 ASSAY OF LACTIC ACID: CPT | Performed by: INTERNAL MEDICINE

## 2024-12-19 PROCEDURE — 25000242 PHARM REV CODE 250 ALT 637 W/ HCPCS: Performed by: NURSE PRACTITIONER

## 2024-12-19 RX ORDER — LIDOCAINE HYDROCHLORIDE 10 MG/ML
INJECTION, SOLUTION INFILTRATION; PERINEURAL
Status: DISPENSED
Start: 2024-12-19 | End: 2024-12-20

## 2024-12-19 RX ORDER — FOLIC ACID 1 MG/1
1 TABLET ORAL DAILY
Status: DISCONTINUED | OUTPATIENT
Start: 2024-12-19 | End: 2024-12-23 | Stop reason: HOSPADM

## 2024-12-19 RX ORDER — ACETAMINOPHEN 325 MG/1
650 TABLET ORAL EVERY 4 HOURS PRN
Status: DISCONTINUED | OUTPATIENT
Start: 2024-12-19 | End: 2024-12-23 | Stop reason: HOSPADM

## 2024-12-19 RX ORDER — ONDANSETRON HYDROCHLORIDE 2 MG/ML
4 INJECTION, SOLUTION INTRAVENOUS EVERY 8 HOURS PRN
Status: DISCONTINUED | OUTPATIENT
Start: 2024-12-19 | End: 2024-12-23 | Stop reason: HOSPADM

## 2024-12-19 RX ORDER — THIAMINE HYDROCHLORIDE 100 MG/ML
400 INJECTION, SOLUTION INTRAMUSCULAR; INTRAVENOUS 3 TIMES DAILY
Status: DISCONTINUED | OUTPATIENT
Start: 2024-12-19 | End: 2024-12-22

## 2024-12-19 RX ADMIN — PIPERACILLIN SODIUM AND TAZOBACTAM SODIUM 4.5 G: 4; .5 INJECTION, POWDER, LYOPHILIZED, FOR SOLUTION INTRAVENOUS at 09:12

## 2024-12-19 RX ADMIN — AMLODIPINE BESYLATE 10 MG: 10 TABLET ORAL at 08:12

## 2024-12-19 RX ADMIN — FOLIC ACID 1 MG: 1 TABLET ORAL at 08:12

## 2024-12-19 RX ADMIN — HYDROCODONE BITARTRATE AND ACETAMINOPHEN 1 TABLET: 5; 325 TABLET ORAL at 09:12

## 2024-12-19 RX ADMIN — SENNOSIDES AND DOCUSATE SODIUM 1 TABLET: 50; 8.6 TABLET ORAL at 09:12

## 2024-12-19 RX ADMIN — PIPERACILLIN SODIUM AND TAZOBACTAM SODIUM 4.5 G: 4; .5 INJECTION, POWDER, LYOPHILIZED, FOR SOLUTION INTRAVENOUS at 01:12

## 2024-12-19 RX ADMIN — THIAMINE HYDROCHLORIDE 400 MG: 100 INJECTION, SOLUTION INTRAMUSCULAR; INTRAVENOUS at 09:12

## 2024-12-19 RX ADMIN — IPRATROPIUM BROMIDE AND ALBUTEROL SULFATE 3 ML: .5; 3 SOLUTION RESPIRATORY (INHALATION) at 08:12

## 2024-12-19 RX ADMIN — IPRATROPIUM BROMIDE AND ALBUTEROL SULFATE 3 ML: .5; 3 SOLUTION RESPIRATORY (INHALATION) at 12:12

## 2024-12-19 RX ADMIN — PIPERACILLIN SODIUM AND TAZOBACTAM SODIUM 4.5 G: 4; .5 INJECTION, POWDER, LYOPHILIZED, FOR SOLUTION INTRAVENOUS at 06:12

## 2024-12-19 RX ADMIN — THIAMINE HYDROCHLORIDE 400 MG: 100 INJECTION, SOLUTION INTRAMUSCULAR; INTRAVENOUS at 08:12

## 2024-12-19 RX ADMIN — THIAMINE HYDROCHLORIDE 400 MG: 100 INJECTION, SOLUTION INTRAMUSCULAR; INTRAVENOUS at 03:12

## 2024-12-19 RX ADMIN — IPRATROPIUM BROMIDE AND ALBUTEROL SULFATE 3 ML: .5; 3 SOLUTION RESPIRATORY (INHALATION) at 07:12

## 2024-12-19 RX ADMIN — SENNOSIDES AND DOCUSATE SODIUM 1 TABLET: 50; 8.6 TABLET ORAL at 08:12

## 2024-12-19 NOTE — ASSESSMENT & PLAN NOTE
Pt consumes at least 1 6 pk of Beer daily  Last drink 4 days ago  DT Precautions  CIWA Q shift  Ativan q 2 prn seizures, severe agitation

## 2024-12-19 NOTE — PROCEDURES
"Isidoro Martinez is a 69 y.o. male patient.    Temp: 98 °F (36.7 °C) (12/19/24 1624)  Pulse: 87 (12/19/24 1624)  Resp: 18 (12/19/24 1624)  BP: 124/87 (12/19/24 1624)  SpO2: 96 % (12/19/24 1624)  Weight: 76.2 kg (168 lb) (12/18/24 1720)  Height: 5' 8" (172.7 cm) (12/18/24 1720)       Procedures    12/19/2024      Chest tube (pigtail) Procedure Note    Indications:  Large left-sided pleural effusion    The patient understood the risks and possible complications of the procedure, signed informed consent and wished to proceed.    DESCRIPTION OF PROCEDURE:  A time out was performed and the chest imaging was reviewed. The left side was confirmed and marked.  The patient's vitals were monitored throughout the procedure.    Procedure appropriate sterile precautions were followed. A safe procedural window was selected using ultrasound guidance by identifying the lung, diaphragm and the pleural effusion.  On ultrasound, the fluid appeared to be heterogeneously echogenic. A Chlorhexidine scrub was used to clean the skin.  A Chlorhexidine scrub was used to clean the skin. The patient was prepped and draped in a sterile manner.   1% lidocaine was then used to anesthesize the skin,  subcutaneous tissue, superior aspect of the rib periosteum and the parietal  pleura at the 8th  rib space . The chest tube needle was then inserted into the same space with aspiration of Dark yellow fluid.  A guidewire was then inserted and the needle was removed.  The guidewire was confirmed to be in the pleural space using bedside ultrasound, after identifying the diaphragm and lung.  A scalpel was used to nick the skin at the insertion site.      The skin dilator was then introduced through the skin incision to dilate subcutaneous tissue.  This was then removed and the chest tube with a trocar was inserted into the pleural space over the guidewire.  The trocar was then removed approximately 4 cm into the space and the chest tube was threaded over the " rest of the guidewire.  The guidewire and the trocar were then removed together.  The chest tube was then secured using sutures and tape, and the connection to the chest tube atrium was additionally secured.     A total of 2100 ml of fluid was immediately aspirated into the chest tube atrium, with no evidence of persistent air leak.  No immediate complications were noted during the procedure.     A post-procedural chest x-ray demonstrated no evidence of pneumothorax with the reduction pleural effusion, however with a significant amount of pleural fluid remaining.  Chest tube looked in adequate position.  Pleural fluid has been sent for analysis, including cytology.  Estimated blood loss is <5ml.     Pleural fluid studies are pending at this time.  All ultrasound images have been saved in the patient's medical record.    Edgar Saleem MD  Interventional Pulmonary and Critical Care  [facility]

## 2024-12-19 NOTE — ASSESSMENT & PLAN NOTE
Continue management with broad-spectrum antibiotics, follow up blood cultures.  We will send pleural fluid for analysis, including cytology and cultures.

## 2024-12-19 NOTE — HOSPITAL COURSE
12/19 short of breath tachypneic today.  Plan for thoracentesis  12/20 breathing better today  12/23 stable for discharge.  Follow up with Dr. Saleem we will have PET-CT imaging prior to follow up.  Follow up with Dr. Miranda in 1 week.  We will give 5 more days of Augmentin

## 2024-12-19 NOTE — PLAN OF CARE
[facility]  Initial Discharge Assessment       Primary Care Provider: Gavino Miranda MD    Admission Diagnosis: Shortness of breath [R06.02]  Pleural effusion [J90]  Chest pain [R07.9]  Cough, unspecified type [R05.9]    Admission Date: 12/18/2024  Expected Discharge Date:     Transition of Care Barriers: None    Payor: [employer] MANAGED MEDICARE / Plan: [employer] MEDICARE PPO / Product Type: Medicare Advantage /     Extended Emergency Contact Information  Primary Emergency Contact: Juan,Alpa  Mobile Phone: [phone]  Relation: Daughter  Preferred language: English   needed? No  Secondary Emergency Contact: Pepper Davenport  Mobile Phone: [phone]  Relation: Spouse    Discharge Plan A: Home with family  Discharge Plan B: Home Health, Long-term acute care facility (LTAC), Skilled Nursing Facility      [id number] [address] AT [facility] [address]  [address]  [place] [address]  Phone: [phone] Fax: [phone]      Initial Assessment (most recent)       Adult Discharge Assessment - 12/19/24 1214          Discharge Assessment    Assessment Type Discharge Planning Assessment     Confirmed/corrected address, phone number and insurance Yes     Source of Information patient     Communicated RAHAT with patient/caregiver Date not available/Unable to determine     People in Home alone     Do you expect to return to your current living situation? Yes     Do you have help at home or someone to help you manage your care at home? Yes     Prior to hospitilization cognitive status: Unable to Assess     Current cognitive status: Alert/Oriented     Walking or Climbing Stairs Difficulty no     Dressing/Bathing Difficulty no     Equipment Currently Used at Home none     Do you currently have service(s) that help you manage your care at home? No     Do you have prescription coverage? Yes     Coverage [employer]     Do you have any problems  affording any of your prescribed medications? No     Is the patient taking medications as prescribed? yes     Who is going to help you get home at discharge? fiance     How do you get to doctors appointments? family or friend will provide     Are you on dialysis? No     Discharge Plan A Home with family     Discharge Plan B Home Health;Long-term acute care facility (LTAC);Skilled Nursing Facility     DME Needed Upon Discharge  none     Discharge Plan discussed with: Patient     Transition of Care Barriers None        Physical Activity    On average, how many days per week do you engage in moderate to strenuous exercise (like a brisk walk)? 0 days     On average, how many minutes do you engage in exercise at this level? 0 min                 Sdoh completed yesterday. Ss spoke with pt and pt lives at home alone and plans to return at d/c. No d/c needs stated at this time. Ss following.

## 2024-12-19 NOTE — ASSESSMENT & PLAN NOTE
On CT of chest with contrast, patient was found to have a large left pleural effusion with complete collapse of the left lung and a 2 cm ground-glass opacification within the right lower lobe. A thoracentesis was ordered. Most likely etiology includes  parapneumonic effusion vs pneumonia vs malignancy . Will consult IR for drainage.     Pleural fluid labs/micro ordered  Empiric coverage with Vanc and Zosyn  Consult IR for thoracentesis    Thoracentesis today

## 2024-12-19 NOTE — PROGRESS NOTES
Pharmacy consulted to assist with the management of vancomycin in this patient to treat: empyema with effusion    Patient weight: 76.2 kg  Patient CrCl: ~72 ml/min    Will begin vancomycin: 1500mg every 18 hours    Vancomycin level ordered before 4th dose.     Pharmacy will continue to monitor and make adjustments as needed.      Thank you for the consult,    Erlinda Guerrero, PharmD  [phone]

## 2024-12-19 NOTE — H&P
[facility] - [address] [place] [address] Medicine  History & Physical    Patient Name: Isidoro Martinez  MRN: [id number]  Patient Class: IP- Inpatient  Admission Date: 12/18/2024  Attending Physician:  MOHINI Rascon DO  Primary Care Provider: Gavino Miranda MD         Patient information was obtained from patient and ER records.     Subjective:     Principal Problem:Pleural effusion    Chief Complaint:   Chief Complaint   Patient presents with    Shortness of Breath     Patient presents to ED from Dr. Malone's office with c/o SOB x 3-4 days.  Patient states he was just diagnosed with pneumonia.          HPI: The patient, a 69-year-old, with a hx of HTN and Daily alcohol consumtion reported feeling unwell for the past three to four days, experiencing shortness of breath and cough productive of sputum with some color. The patient visited Dr. Malone, who performed a chest X-ray and referred the patient to the emergency room.  The patient mentioned a history of feeling funny about 2 weeks ago with sharp chest pains, He was evaluated by his PCP, which prompted an EKG that returned normal. The patient reported no history of similar respiratory issues. The patient did not report fever, chills, chest pain, or leg swelling. Patient endorses daily consumption of 6 pack of beer with last use 4 days ago.    In the ED initial vitals were /82, , T 98.5° F, SpO2 94% on room air.  Initial labs show WBC of 16.74 with a platelet count of 524 remainder of CBC is unremarkable CMP shows a sodium of 133 glucose of 154 otherwise unremarkable. CXR shows complete opacification of the left hemithorax consistent with large pleural effusion.  Patient will be admitted for further evaluation and intervention          Past Medical History:   Diagnosis Date    Hypertension        Past Surgical History:   Procedure Laterality Date    COLONOSCOPY      HERNIA REPAIR      x2       Review of patient's allergies indicates:  No  Known Allergies    No current facility-administered medications on file prior to encounter.     Current Outpatient Medications on File Prior to Encounter   Medication Sig    amLODIPine (NORVASC) 10 MG tablet Take 10 mg by mouth once daily.    meloxicam (MOBIC) 15 MG tablet TAKE 1 TABLET(15 MG) BY MOUTH EVERY DAY     Family History       Problem Relation (Age of Onset)    Diabetes Mother, Sister, Brother    Heart attack Father    Stroke Father          Tobacco Use    Smoking status: Every Day     Current packs/day: 1.00     Types: Cigarettes    Smokeless tobacco: Never   Substance and Sexual Activity    Alcohol use: Yes     Alcohol/week: 42.0 standard drinks of alcohol     Types: 42 Cans of beer per week     Comment: socially    Drug use: Never    Sexual activity: Yes     Review of Systems   Constitutional:  Positive for activity change. Negative for appetite change, chills, fatigue and fever.   HENT:  Negative for congestion and trouble swallowing.    Eyes:  Negative for visual disturbance.   Respiratory:  Positive for cough, chest tightness and shortness of breath. Negative for wheezing.    Cardiovascular:  Positive for chest pain. Negative for palpitations and leg swelling.   Gastrointestinal:  Negative for abdominal pain, blood in stool, constipation, diarrhea, nausea and vomiting.   Genitourinary:  Negative for dysuria and hematuria.   Skin:  Negative for rash.   Neurological:  Negative for dizziness, weakness and light-headedness.     Objective:     Vital Signs (Most Recent):  Temp: 97.8 °F (36.6 °C) (12/18/24 2105)  Pulse: 99 (12/19/24 0007)  Resp: 18 (12/19/24 0007)  BP: 138/82 (12/19/24 0007)  SpO2: 98 % (12/19/24 0007) Vital Signs (24h Range):  Temp:  [97.3 °F (36.3 °C)-98.5 °F (36.9 °C)] 97.8 °F (36.6 °C)  Pulse:  [] 99  Resp:  [18-31] 18  SpO2:  [94 %-98 %] 98 %  BP: (124-161)/(82-99) 138/82     Weight: 76.2 kg (168 lb)  Body mass index is 25.54 kg/m².     Physical Exam  Vitals and nursing note  reviewed.   Constitutional:       General: He is not in acute distress.     Appearance: Normal appearance. He is ill-appearing. He is not toxic-appearing or diaphoretic.   HENT:      Nose: Nose normal.      Mouth/Throat:      Mouth: Mucous membranes are moist.   Eyes:      Pupils: Pupils are equal, round, and reactive to light.   Cardiovascular:      Rate and Rhythm: Normal rate and regular rhythm.      Heart sounds: Normal heart sounds.   Pulmonary:      Effort: Pulmonary effort is normal.      Comments: No breath sounds on left  Abdominal:      Palpations: Abdomen is soft.   Musculoskeletal:         General: Normal range of motion.      Cervical back: Neck supple.      Right lower leg: No edema.      Left lower leg: No edema.   Skin:     General: Skin is warm and dry.   Neurological:      Mental Status: He is alert and oriented to person, place, and time.              CRANIAL NERVES     CN III, IV, VI   Pupils are equal, round, and reactive to light.       Significant Labs: All pertinent labs within the past 24 hours have been reviewed.    Significant Imaging: I have reviewed all pertinent imaging results/findings within the past 24 hours.  Assessment/Plan:     * Pleural effusion    On CT of chest with contrast, patient was found to have a large left pleural effusion with complete collapse of the left lung and a 2 cm ground-glass opacification within the right lower lobe. A thoracentesis was ordered. Most likely etiology includes  parapneumonic effusion vs pneumonia vs malignancy . Will consult IR for drainage.     Pleural fluid labs/micro ordered  Empiric coverage with Vanc and Zosyn  Consult IR for thoracentesis        Sepsis due to pneumonia    This patient does have evidence of infective focus  My overall impression is sepsis.  Source: Respiratory  Antibiotics given-   Antibiotics (72h ago, onward)      Start     Stop Route Frequency Ordered    12/18/24 2300  vancomycin (VANCOCIN) 1,500 mg in 0.9% NaCl 250 mL  IVPB         -- IV Every 18 hours 12/18/24 2127 12/18/24 2226  vancomycin - pharmacy to dose         -- IV pharmacy to manage frequency 12/18/24 2127 12/18/24 2200  piperacillin-tazobactam (ZOSYN) 4.5 g in D5W 100 mL IVPB (MB+)         -- IV Every 8 hours (non-standard times) 12/18/24 2049            Fluid challenge Not needed - patient is not hypotensive      Post- resuscitation assessment No - Post resuscitation assessment not needed       Will Not start Pressors- Levophed for MAP of 65  Source control achieved by: Empiric ABX    Alcohol dependency  Pt consumes at least 1 6 pk of Beer daily  Last drink 4 days ago  DT Precautions  CIWA Q shift  Ativan q 2 prn seizures, severe agitation  Thiamine and folate supplementation      Hypertension    Patient's blood pressure range in the last 24 hours was: BP  Min: 124/82  Max: 161/99.The patient's inpatient anti-hypertensive regimen is listed below:    Current Antihypertensives    amLODIPine tablet 10 mg, Daily, Oral    Plan    - BP is controlled, no changes needed to their regimen  -       VTE Risk Mitigation (From admission, onward)           Ordered     Reason for No Pharmacological VTE Prophylaxis  Once        Question:  Reasons:  Answer:  Physician Provided (leave comment)  Comment:  Procedure in AM (thoracentesis)    12/18/24 1948     Place SIS hose  Until discontinued         12/18/24 1948     IP VTE LOW RISK PATIENT  Once         12/18/24 1948                               Pharmacy consulted to assist with the management of vancomycin in this patient to treat: empyema with effusion    Patient weight: 76.2 kg  Patient CrCl: ~72 ml/min    Will begin vancomycin: 1500mg every 18 hours    Vancomycin level ordered before 4th dose.     Pharmacy will continue to monitor and make adjustments as needed.      Thank you for the consult,    Erlinda Guerrero, PharmD  [phone]    Cruz Edge MD  Department of Hospital Medicine  [facility] - [facility]  Telemetry

## 2024-12-19 NOTE — SUBJECTIVE & OBJECTIVE
Past Medical History:   Diagnosis Date    Hypertension        Past Surgical History:   Procedure Laterality Date    COLONOSCOPY      HERNIA REPAIR      x2       Review of patient's allergies indicates:  No Known Allergies    No current facility-administered medications on file prior to encounter.     Current Outpatient Medications on File Prior to Encounter   Medication Sig    amLODIPine (NORVASC) 10 MG tablet Take 10 mg by mouth once daily.    meloxicam (MOBIC) 15 MG tablet TAKE 1 TABLET(15 MG) BY MOUTH EVERY DAY     Family History       Problem Relation (Age of Onset)    Diabetes Mother, Sister, Brother    Heart attack Father    Stroke Father          Tobacco Use    Smoking status: Every Day     Current packs/day: 1.00     Types: Cigarettes    Smokeless tobacco: Never   Substance and Sexual Activity    Alcohol use: Yes     Alcohol/week: 42.0 standard drinks of alcohol     Types: 42 Cans of beer per week     Comment: socially    Drug use: Never    Sexual activity: Yes     Review of Systems   Constitutional:  Positive for activity change. Negative for appetite change, chills, fatigue and fever.   HENT:  Negative for congestion and trouble swallowing.    Eyes:  Negative for visual disturbance.   Respiratory:  Positive for cough, chest tightness and shortness of breath. Negative for wheezing.    Cardiovascular:  Positive for chest pain. Negative for palpitations and leg swelling.   Gastrointestinal:  Negative for abdominal pain, blood in stool, constipation, diarrhea, nausea and vomiting.   Genitourinary:  Negative for dysuria and hematuria.   Skin:  Negative for rash.   Neurological:  Negative for dizziness, weakness and light-headedness.     Objective:     Vital Signs (Most Recent):  Temp: 97.8 °F (36.6 °C) (12/18/24 2105)  Pulse: 99 (12/19/24 0007)  Resp: 18 (12/19/24 0007)  BP: 138/82 (12/19/24 0007)  SpO2: 98 % (12/19/24 0007) Vital Signs (24h Range):  Temp:  [97.3 °F (36.3 °C)-98.5 °F (36.9 °C)] 97.8 °F (36.6  °C)  Pulse:  [] 99  Resp:  [18-31] 18  SpO2:  [94 %-98 %] 98 %  BP: (124-161)/(82-99) 138/82     Weight: 76.2 kg (168 lb)  Body mass index is 25.54 kg/m².     Physical Exam  Vitals and nursing note reviewed.   Constitutional:       General: He is not in acute distress.     Appearance: Normal appearance. He is ill-appearing. He is not toxic-appearing or diaphoretic.   HENT:      Nose: Nose normal.      Mouth/Throat:      Mouth: Mucous membranes are moist.   Eyes:      Pupils: Pupils are equal, round, and reactive to light.   Cardiovascular:      Rate and Rhythm: Normal rate and regular rhythm.      Heart sounds: Normal heart sounds.   Pulmonary:      Effort: Pulmonary effort is normal.      Comments: No breath sounds on left  Abdominal:      Palpations: Abdomen is soft.   Musculoskeletal:         General: Normal range of motion.      Cervical back: Neck supple.      Right lower leg: No edema.      Left lower leg: No edema.   Skin:     General: Skin is warm and dry.   Neurological:      Mental Status: He is alert and oriented to person, place, and time.              CRANIAL NERVES     CN III, IV, VI   Pupils are equal, round, and reactive to light.       Significant Labs: All pertinent labs within the past 24 hours have been reviewed.    Significant Imaging: I have reviewed all pertinent imaging results/findings within the past 24 hours.

## 2024-12-19 NOTE — HPI
The patient, a 69-year-old, with a hx of HTN and Daily alcohol consumtion reported feeling unwell for the past three to four days, experiencing shortness of breath and cough productive of sputum with some color. The patient visited Dr. Malone, who performed a chest X-ray and referred the patient to the emergency room.  The patient mentioned a history of feeling funny about 2 weeks ago with sharp chest pains, He was evaluated by his PCP, which prompted an EKG that returned normal. The patient reported no history of similar respiratory issues. The patient did not report fever, chills, chest pain, or leg swelling. Patient endorses daily consumption of 6 pack of beer with last use 4 days ago.    In the ED initial vitals were /82, , T 98.5° F, SpO2 94% on room air.  Initial labs show WBC of 16.74 with a platelet count of 524 remainder of CBC is unremarkable CMP shows a sodium of 133 glucose of 154 otherwise unremarkable. CXR shows complete whiteout of the left hemithorax consistent with large pleural effusion.  Ct chest pending.    Patient will be admitted to hospital medicine service under the direct supervision of Dr ARREAGA for further evaluation and management.   IR drainage of pleural effusion with labs is ordered, however will consult Gen surgery as there is a large fluid collection outside of thorax that may communicate with pleural effusion. CT report pending

## 2024-12-19 NOTE — H&P
[facility] - Emergency Department  Hospital Medicine  History & Physical    Patient Name: Isidoro Martinez  MRN: [id number]  Patient Class: IP- Inpatient  Admission Date: 12/18/2024  Attending Physician: Cruz Edge MD   Primary Care Provider: Gavino Miranda MD         Patient information was obtained from patient and ER records.     Subjective:     Principal Problem: A large L sided pleural effusion and R sided infiltrate     Chief Complaint:   Chief Complaint   Patient presents with    Shortness of Breath     Patient presents to ED from Dr. Malone's office with c/o SOB x 3-4 days.  Patient states he was just diagnosed with pneumonia.          HPI: The patient, a 69-year-old, with a hx of HTN and Daily alcohol consumtion reported feeling unwell for the past three to four days, experiencing shortness of breath and cough productive of sputum with some color. The patient visited Dr. Malone, who performed a chest X-ray and referred the patient to the emergency room.  The patient mentioned a history of feeling funny about 2 weeks ago with sharp chest pains, He was evaluated by his PCP, which prompted an EKG that returned normal. The patient reported no history of similar respiratory issues. The patient did not report fever, chills, chest pain, or leg swelling. Patient endorses daily consumption of 6 pack of beer with last use 4 days ago.    In the ED initial vitals were /82, , T 98.5° F, SpO2 94% on room air.  Initial labs show WBC of 16.74 with a platelet count of 524 remainder of CBC is unremarkable CMP shows a sodium of 133 glucose of 154 otherwise unremarkable. CXR shows complete whiteout of the left hemithorax consistent with large pleural effusion.  Ct chest pending.    Patient will be admitted to hospital medicine service under the direct supervision of Dr EDGE for further evaluation and management.   IR drainage of pleural effusion with labs is ordered, however will consult Gen surgery as  there is a large fluid collection outside of thorax that may communicate with pleural effusion. CT report pending    Past Medical History:   Diagnosis Date    Hypertension        Past Surgical History:   Procedure Laterality Date    COLONOSCOPY      HERNIA REPAIR      x2       Review of patient's allergies indicates:  No Known Allergies    Current Facility-Administered Medications on File Prior to Encounter   Medication    [COMPLETED] cefTRIAXone injection 500 mg    [COMPLETED] dexAMETHasone injection 4 mg     Current Outpatient Medications on File Prior to Encounter   Medication Sig    amLODIPine (NORVASC) 10 MG tablet Take 10 mg by mouth once daily.    meloxicam (MOBIC) 15 MG tablet TAKE 1 TABLET(15 MG) BY MOUTH EVERY DAY    [DISCONTINUED] amLODIPine (NORVASC) 5 MG tablet TAKE 1 TABLET(5 MG) BY MOUTH EVERY DAY    [DISCONTINUED] cetirizine (ZYRTEC) 10 MG tablet Take 1 tablet (10 mg total) by mouth once daily. (Patient not taking: Reported on 12/18/2024)    [DISCONTINUED] diclofenac sodium (VOLTAREN) 1 % Gel Apply 2 g topically 3 (three) times daily as needed. (Patient not taking: Reported on 12/18/2024)     Family History       Problem Relation (Age of Onset)    Diabetes Mother, Sister, Brother    Heart attack Father    Stroke Father          Tobacco Use    Smoking status: Every Day     Current packs/day: 1.00     Types: Cigarettes    Smokeless tobacco: Never   Substance and Sexual Activity    Alcohol use: Yes     Alcohol/week: 42.0 standard drinks of alcohol     Types: 42 Cans of beer per week     Comment: socially    Drug use: Never    Sexual activity: Yes     Review of Systems   Constitutional:  Positive for activity change. Negative for appetite change, chills, fatigue and fever.   HENT:  Negative for congestion and trouble swallowing.    Eyes:  Negative for visual disturbance.   Respiratory:  Positive for cough, chest tightness and shortness of breath. Negative for wheezing.    Cardiovascular:  Positive for  chest pain. Negative for palpitations and leg swelling.   Gastrointestinal:  Negative for abdominal pain, blood in stool, constipation, diarrhea, nausea and vomiting.   Genitourinary:  Negative for dysuria and hematuria.   Skin:  Negative for rash.   Neurological:  Negative for dizziness, weakness and light-headedness.     Objective:     Vital Signs (Most Recent):  Temp: 98.5 °F (36.9 °C) (12/18/24 1720)  Pulse: (!) 117 (12/18/24 2007)  Resp: (!) 26 (12/18/24 2007)  BP: (!) 153/90 (12/18/24 2001)  SpO2: 97 % (12/18/24 2007) Vital Signs (24h Range):  Temp:  [97.3 °F (36.3 °C)-98.5 °F (36.9 °C)] 98.5 °F (36.9 °C)  Pulse:  [115-130] 117  Resp:  [20-31] 26  SpO2:  [94 %-97 %] 97 %  BP: (124-153)/(82-93) 153/90     Weight: 76.2 kg (168 lb)  Body mass index is 25.54 kg/m².     Physical Exam  Vitals and nursing note reviewed.   Constitutional:       General: He is not in acute distress.     Appearance: Normal appearance. He is ill-appearing. He is not toxic-appearing or diaphoretic.   HENT:      Nose: Nose normal.      Mouth/Throat:      Mouth: Mucous membranes are moist.   Eyes:      Pupils: Pupils are equal, round, and reactive to light.   Cardiovascular:      Rate and Rhythm: Normal rate and regular rhythm.      Heart sounds: Normal heart sounds.   Pulmonary:      Effort: Pulmonary effort is normal.      Comments: No breath sounds on left  Abdominal:      Palpations: Abdomen is soft.   Musculoskeletal:         General: Normal range of motion.      Cervical back: Neck supple.      Right lower leg: No edema.      Left lower leg: No edema.   Skin:     General: Skin is warm and dry.   Neurological:      Mental Status: He is alert and oriented to person, place, and time.              CRANIAL NERVES     CN III, IV, VI   Pupils are equal, round, and reactive to light.       Significant Labs: All pertinent labs within the past 24 hours have been reviewed.    Significant Imaging: I have reviewed all pertinent imaging  results/findings within the past 24 hours.  Assessment/Plan:     Pleural effusion    Patient was found to have a large left pleural effusion with complete collapse of the left lung and 2 cm ground-glass opacification within the right lower lobe. A thoracentesis was ordered. Most likely etiology includes  Empyema vs pneumonia vs malignancy . Will consult IR for drainage, Pleural fluid labs/micro ordered    - Empiric abx coverage with Vanc and Zosyn  - IR for thoracentesis        Hypertension  Patient's blood pressure range in the last 24 hours was: BP  Min: 124/82  Max: 153/90.The patient's inpatient anti-hypertensive regimen is listed below:  Current Antihypertensives  , Daily, Oral  amLODIPine tablet 10 mg, Daily, Oral    Plan  - BP is controlled, no changes needed to their regimen      Alcohol dependency  Pt consumes at least 1 6 pk of Beer daily  Last drink 4 days ago  DT Precautions  CIWA Q shift  Ativan q 2 prn seizures, severe agitation  Thiamine and folic acid replacement        VTE Risk Mitigation (From admission, onward)           Ordered     Reason for No Pharmacological VTE Prophylaxis  Once        Question:  Reasons:  Answer:  Physician Provided (leave comment)  Comment:  Procedure in AM (thoracentesis)    12/18/24 1948     Place SIS hose  Until discontinued         12/18/24 1948     IP VTE LOW RISK PATIENT  Once         12/18/24 1948                                    Cruz Edge MD  Department of Hospital Medicine  [facility] - Emergency Department

## 2024-12-19 NOTE — ASSESSMENT & PLAN NOTE
Patient has a very large left-sided pleural effusion with associated compressive atelectasis.  Along with his leukocytosis, history of recent malaise, myalgias, there suspicion that this may be parapneumonic or infectious in etiology.  Based on CT chest, this does not appear to have the appearance of an empyema, but we will evaluate for that with diagnostic fluid.  Malignancy is also on the differential.    I had a conversation with the patient offering thoracentesis versus chest tube.  Given the amount of pleural effusion, drainage with 14 Hungarian pigtail catheter may be more gentle, mitigating the development of re-expansion pulmonary edema ending allowing us to evaluate the pleural space with a CT scan tomorrow.  The patient has agreed for ultrasound-guided chest tube placement.  He is not on any anticoagulation and platelets are within normal limits.      I discussed the risk/benefits of placing a chest tube.  Risk include but not limited to bleeding, infection, pneumothorax, injury to the lung or surrounding structures.  Patient agrees at this time.  I offered to talk to family but he said that he does not want that and he has a brother who will be coming to visit tomorrow.

## 2024-12-19 NOTE — PROGRESS NOTES
[facility] - [facility] Telemetry  Wound Care    Patient Name:  Isidoro Martinez   MRN:  [id number]  Date: 12/19/2024  Diagnosis: Pleural effusion    History:     Past Medical History:   Diagnosis Date    Hypertension        Social History     Socioeconomic History    Marital status: Single   Tobacco Use    Smoking status: Every Day     Current packs/day: 1.00     Types: Cigarettes    Smokeless tobacco: Never   Substance and Sexual Activity    Alcohol use: Yes     Alcohol/week: 42.0 standard drinks of alcohol     Types: 42 Cans of beer per week     Comment: socially    Drug use: Never    Sexual activity: Yes     Social Drivers of Health     Financial Resource Strain: Low Risk  (12/18/2024)    Overall Financial Resource Strain (CARDIA)     Difficulty of Paying Living Expenses: Not very hard   Food Insecurity: No Food Insecurity (12/18/2024)    Hunger Vital Sign     Worried About Running Out of Food in the Last Year: Never true     Ran Out of Food in the Last Year: Never true   Transportation Needs: Unmet Transportation Needs (12/18/2024)    TRANSPORTATION NEEDS     Transportation : Yes, it has kept me from medical appointments or from getting my medications.   Physical Activity: Inactive (12/19/2024)    Exercise Vital Sign     Days of Exercise per Week: 0 days     Minutes of Exercise per Session: 0 min   Stress: No Stress Concern Present (12/18/2024)    [employer] - Occupational Stress Questionnaire     Feeling of Stress : Not at all   Housing Stability: Low Risk  (12/18/2024)    Housing Stability Vital Sign     Unable to Pay for Housing in the Last Year: No     Homeless in the Last Year: No       Precautions:     Allergies as of 12/18/2024    (No Known Allergies)       WOC Assessment Details/Treatment     Narrative: Seen patient for initial preventative skin care measures.  Patient ambulates.  No foam borders , open wounds, or redness noted to bilateral heels and  sacral.  Consult wound care of any findings.      12/19/2024

## 2024-12-19 NOTE — CONSULTS
[facility]  Critical Care Medicine  Consult Note    Patient Name: Isidoro Martinez  MRN: [id number]  Admission Date: 12/18/2024  Hospital Length of Stay: 1 days  Code Status: Full Code  Attending Physician: Karl Rascon DO   Primary Care Provider: Gavino Miranda MD   Principal Problem: Pleural effusion    Inpatient consult to Pulmonology  Consult performed by: Edgar Saleem MD  Consult ordered by: Karl Rascon DO        Subjective:     HPI:  This is a 69-year-old gentleman with past medical history significant for hypertension who presented to the hospital on 12/1924 in the setting of shortness of breath, cough which was productive.      I personally reviewed the patient's imaging (chest x-ray which showed complete opacification of left hemithorax.  There were some evidence of air bronchograms.  He also had a CT chest with contrast performed in the evening of 12/18/24 which showed evidence of a large left-sided pleural effusion with the associated compressive atelectasis along with mediastinal shift towards the contralateral side.  When I saw the patient at bedside today, he was saturating at 96% on 2 L nasal cannula.  He had some dyspnea was was still able to speak in complete sentences.  He denies any significant weight loss or hemoptysis.    Hospital/ICU Course:  No notes on file    Past Medical History:   Diagnosis Date    Hypertension        Past Surgical History:   Procedure Laterality Date    COLONOSCOPY      HERNIA REPAIR      x2       Review of patient's allergies indicates:  No Known Allergies    Family History       Problem Relation (Age of Onset)    Diabetes Mother, Sister, Brother    Heart attack Father    Stroke Father          Tobacco Use    Smoking status: Every Day     Current packs/day: 1.00     Types: Cigarettes    Smokeless tobacco: Never   Substance and Sexual Activity    Alcohol use: Yes     Alcohol/week: 42.0 standard drinks of alcohol     Types: 42  Cans of beer per week     Comment: socially    Drug use: Never    Sexual activity: Yes      Review of Systems  Objective:     Vital Signs (Most Recent):  Temp: 98 °F (36.7 °C) (12/19/24 1624)  Pulse: 87 (12/19/24 1624)  Resp: 18 (12/19/24 1624)  BP: 124/87 (12/19/24 1624)  SpO2: 96 % (12/19/24 1624) Vital Signs (24h Range):  Temp:  [97.8 °F (36.6 °C)-98.7 °F (37.1 °C)] 98 °F (36.7 °C)  Pulse:  [] 87  Resp:  [16-31] 18  SpO2:  [94 %-98 %] 96 %  BP: (121-161)/(79-99) 124/87   Weight: 76.2 kg (168 lb)  Body mass index is 25.54 kg/m².      Intake/Output Summary (Last 24 hours) at 12/19/2024 1650  Last data filed at 12/19/2024 0906  Gross per 24 hour   Intake --   Output 200 ml   Net -200 ml          Physical Exam  Constitutional:       General: He is not in acute distress.     Appearance: Normal appearance. He is normal weight. He is not ill-appearing or diaphoretic.   HENT:      Head: Normocephalic and atraumatic.      Nose: No congestion or rhinorrhea.      Mouth/Throat:      Mouth: Mucous membranes are moist.   Cardiovascular:      Rate and Rhythm: Normal rate and regular rhythm.      Pulses: Normal pulses.      Heart sounds: Normal heart sounds.   Pulmonary:      Effort: Pulmonary effort is normal. No respiratory distress.      Breath sounds: No stridor. No wheezing, rhonchi or rales.      Comments: Near absent breath sounds left hemithorax  Chest:      Chest wall: No tenderness.   Abdominal:      General: Abdomen is flat.   Musculoskeletal:      Cervical back: Normal range of motion. No rigidity.      Right lower leg: No edema.      Left lower leg: No edema.   Skin:     General: Skin is warm.      Findings: No erythema.   Neurological:      General: No focal deficit present.      Mental Status: He is alert and oriented to person, place, and time. Mental status is at baseline.   Psychiatric:         Mood and Affect: Mood normal.         Behavior: Behavior normal.         Thought Content: Thought content  "normal.            Vents:  Oxygen Concentration (%): 28 (12/19/24 0806)  Lines/Drains/Airways       Peripheral Intravenous Line  Duration                  Peripheral IV - Single Lumen 12/18/24 1741 20 G Anterior;Left Forearm <1 day                  Significant Labs:    CBC/Anemia Profile:  Recent Labs   Lab 12/18/24 1739 12/19/24  0134   WBC 16.74* 16.97*   HGB 16.5 15.6   HCT 50.1 47.2   * 473*   MCV 91.6 91.3   RDW 12.9 12.7        Chemistries:  Recent Labs   Lab 12/18/24 1739 12/19/24  0134   * 133*   K 4.2 4.6    101   CO2 17* 20*   BUN 9 10   CREATININE 0.79 0.85   CALCIUM 9.4 9.0   ALBUMIN 3.2* 3.0*   PROT 7.9* 7.2   BILITOT 0.4 0.4   ALKPHOS 75 67   ALT 27 30   AST 31 28       All pertinent labs within the past 24 hours have been reviewed.    Significant Imaging: I have reviewed all pertinent imaging results/findings within the past 24 hours.    ABG  No results for input(s): "PH", "PO2", "PCO2", "HCO3", "BE" in the last 168 hours.  Assessment/Plan:     Pulmonary  * Pleural effusion  Patient has a very large left-sided pleural effusion with associated compressive atelectasis.  Along with his leukocytosis, history of recent malaise, myalgias, there suspicion that this may be parapneumonic or infectious in etiology.  Based on CT chest, this does not appear to have the appearance of an empyema, but we will evaluate for that with diagnostic fluid.  Malignancy is also on the differential.    I had a conversation with the patient offering thoracentesis versus chest tube.  Given the amount of pleural effusion, drainage with 14 Italian pigtail catheter may be more gentle, mitigating the development of re-expansion pulmonary edema ending allowing us to evaluate the pleural space with a CT scan tomorrow.  The patient has agreed for ultrasound-guided chest tube placement.  He is not on any anticoagulation and platelets are within normal limits.      I discussed the risk/benefits of placing a chest " tube.  Risk include but not limited to bleeding, infection, pneumothorax, injury to the lung or surrounding structures.  Patient agrees at this time.  I offered to talk to family but he said that he does not want that and he has a brother who will be coming to visit tomorrow.    ID  Sepsis due to pneumonia  Continue management with broad-spectrum antibiotics, follow up blood cultures.  We will send pleural fluid for analysis, including cytology and cultures.    Other  Alcohol dependency  Defer to primary team         Thank you for your consult.  Myself or Dr. Hernandez on Pulmonary consult will follow-up with patient. Please contact us if you have any additional questions.     Edgar Saleem MD  Critical Care Medicine  [facility] - [address] [place] [address]

## 2024-12-19 NOTE — SUBJECTIVE & OBJECTIVE
Interval History:  No acute events overnight    Review of Systems  Objective:     Vital Signs (Most Recent):  Temp: 97.8 °F (36.6 °C) (12/19/24 0742)  Pulse: 109 (12/19/24 0806)  Resp: 18 (12/19/24 0806)  BP: (!) 135/91 (12/19/24 0742)  SpO2: 98 % (12/19/24 0806) Vital Signs (24h Range):  Temp:  [97.3 °F (36.3 °C)-98.7 °F (37.1 °C)] 97.8 °F (36.6 °C)  Pulse:  [] 109  Resp:  [16-31] 18  SpO2:  [94 %-98 %] 98 %  BP: (124-161)/(82-99) 135/91     Weight: 76.2 kg (168 lb)  Body mass index is 25.54 kg/m².    Intake/Output Summary (Last 24 hours) at 12/19/2024 1106  Last data filed at 12/19/2024 0906  Gross per 24 hour   Intake --   Output 200 ml   Net -200 ml         Physical Exam  Vitals and nursing note reviewed.   Constitutional:       Appearance: Normal appearance. He is ill-appearing.   HENT:      Nose: Nose normal.      Mouth/Throat:      Mouth: Mucous membranes are moist.   Eyes:      Pupils: Pupils are equal, round, and reactive to light.   Cardiovascular:      Rate and Rhythm: Normal rate and regular rhythm.      Heart sounds: Normal heart sounds.   Pulmonary:      Effort: Respiratory distress present.      Comments: No breath sounds on left  Abdominal:      Palpations: Abdomen is soft.   Musculoskeletal:         General: Normal range of motion.      Cervical back: Neck supple.   Skin:     General: Skin is warm and dry.   Neurological:      Mental Status: He is alert and oriented to person, place, and time.             Significant Labs: All pertinent labs within the past 24 hours have been reviewed.    Significant Imaging: I have reviewed all pertinent imaging results/findings within the past 24 hours.

## 2024-12-19 NOTE — PLAN OF CARE
Problem: Adult Inpatient Plan of Care  Goal: Absence of Hospital-Acquired Illness or Injury  Outcome: Progressing     Problem: Pneumonia  Goal: Effective Oxygenation and Ventilation  Outcome: Progressing

## 2024-12-19 NOTE — ASSESSMENT & PLAN NOTE
Pt consumes at least 1 6 pk of Beer daily  Last drink 4 days ago  DT Precautions  CIWA Q shift  Ativan q 2 prn seizures, severe agitation  Thiamine and folate supplementation    CIWA 4

## 2024-12-19 NOTE — HPI
This is a 69-year-old gentleman with past medical history significant for hypertension who presented to the hospital on 12/1924 in the setting of shortness of breath, cough which was productive.      I personally reviewed the patient's imaging (chest x-ray which showed complete opacification of left hemithorax.  There were some evidence of air bronchograms.  He also had a CT chest with contrast performed in the evening of 12/18/24 which showed evidence of a large left-sided pleural effusion with the associated compressive atelectasis along with mediastinal shift towards the contralateral side.  When I saw the patient at bedside today, he was saturating at 96% on 2 L nasal cannula.  He had some dyspnea was was still able to speak in complete sentences.  He denies any significant weight loss or hemoptysis.

## 2024-12-19 NOTE — ASSESSMENT & PLAN NOTE
Patient found to have large pleural effusion on imaging. I have personally reviewed and interpreted the following imaging: Xray. A thoracentesis was ordered. Most likely etiology includes  Empyema vs pneumonia vs malignancy . Management to include  Will consult IR for drainage, Pleural fluid labs/micro ordered, consult Gen surgery for fluid collection external thorax with possible communication    Empiric coverage with Vanc and Zosyn  IR for drainage ordered  Consult General surgery

## 2024-12-19 NOTE — ASSESSMENT & PLAN NOTE
This patient does have evidence of infective focus  My overall impression is sepsis.  Source: Respiratory  Antibiotics given-   Antibiotics (72h ago, onward)      Start     Stop Route Frequency Ordered    12/18/24 2300  vancomycin (VANCOCIN) 1,500 mg in 0.9% NaCl 250 mL IVPB         -- IV Every 18 hours 12/18/24 2127 12/18/24 2226  vancomycin - pharmacy to dose         -- IV pharmacy to manage frequency 12/18/24 2127 12/18/24 2200  piperacillin-tazobactam (ZOSYN) 4.5 g in D5W 100 mL IVPB (MB+)         -- IV Every 8 hours (non-standard times) 12/18/24 2049            Fluid challenge Not needed - patient is not hypotensive      Post- resuscitation assessment No - Post resuscitation assessment not needed       Will Not start Pressors- Levophed for MAP of 65  Source control achieved by: Empiric ABX

## 2024-12-19 NOTE — H&P
[facility] Hospital Medicine  History & Physical    Patient Name: Isidoro Martinez  MRN: [id number]  Patient Class: IP- Inpatient  Admission Date: 12/18/2024  Attending Physician:  MOHINI Rascon DO  Primary Care Provider: Gavino iMranda MD         Patient information was obtained from patient and ER records.     Subjective:     Principal Problem:Pleural effusion    Chief Complaint:   Chief Complaint   Patient presents with    Shortness of Breath     Patient presents to ED from Dr. Malone's office with c/o SOB x 3-4 days.  Patient states he was just diagnosed with pneumonia.          HPI: The patient, a 69-year-old, with a hx of HTN and Daily alcohol consumtion reported feeling unwell for the past three to four days, experiencing shortness of breath and cough productive of sputum with some color. The patient visited Dr. Malone, who performed a chest X-ray and referred the patient to the emergency room.  The patient mentioned a history of feeling funny about 2 weeks ago with sharp chest pains, He was evaluated by his PCP, which prompted an EKG that returned normal. The patient reported no history of similar respiratory issues. The patient did not report fever, chills, chest pain, or leg swelling. Patient endorses daily consumption of 6 pack of beer with last use 4 days ago.    In the ED initial vitals were /82, , T 98.5° F, SpO2 94% on room air.  Initial labs show WBC of 16.74 with a platelet count of 524 remainder of CBC is unremarkable CMP shows a sodium of 133 glucose of 154 otherwise unremarkable. CXR shows complete whiteout of the left hemithorax consistent with large pleural effusion.          No new subjective & objective note has been filed under this hospital service since the last note was generated.    Assessment/Plan:     * Pleural effusion    On CT of chest with contrast, patient was found to have a large left pleural effusion with complete collapse of the left  lung and a 2 cm ground-glass opacification within the right lower lobe. A thoracentesis was ordered. Most likely etiology includes  parapneumonic effusion vs pneumonia vs malignancy . Will consult IR for thoracentesis    Pleural fluid labs/micro ordered  Empiric coverage with Vanc and Zosyn          Sepsis due to pneumonia    This patient does have evidence of infective focus  My overall impression is sepsis.  Source: Respiratory  Antibiotics given-   Antibiotics (72h ago, onward)      Start     Stop Route Frequency Ordered    12/18/24 2300  vancomycin (VANCOCIN) 1,500 mg in 0.9% NaCl 250 mL IVPB         -- IV Every 18 hours 12/18/24 2127 12/18/24 2226  vancomycin - pharmacy to dose         -- IV pharmacy to manage frequency 12/18/24 2127 12/18/24 2200  piperacillin-tazobactam (ZOSYN) 4.5 g in D5W 100 mL IVPB (MB+)         -- IV Every 8 hours (non-standard times) 12/18/24 2049            Fluid challenge Not needed - patient is not hypotensive      Post- resuscitation assessment No - Post resuscitation assessment not needed       Will Not start Pressors- Levophed for MAP of 65  Source control achieved by: Empiric ABX    Alcohol dependency  Pt consumes at least 1 6 pk of Beer daily  Last drink 4 days ago  DT Precautions  CIWA Q shift  Ativan q 2 prn seizures, severe agitation  Thiamine and folate supplementation      Hypertension    Patient's blood pressure range in the last 24 hours was: BP  Min: 124/82  Max: 161/99.The patient's inpatient anti-hypertensive regimen is listed below:    Current Antihypertensives    amLODIPine tablet 10 mg, Daily, Oral    Plan    - BP is controlled, no changes needed to their regimen  -       VTE Risk Mitigation (From admission, onward)           Ordered     Reason for No Pharmacological VTE Prophylaxis  Once        Question:  Reasons:  Answer:  Physician Provided (leave comment)  Comment:  Procedure in AM (thoracentesis)    12/18/24 1948     Place SIS hose  Until discontinued          12/18/24 1948     IP VTE LOW RISK PATIENT  Once         12/18/24 1948                               Pharmacy consulted to assist with the management of vancomycin in this patient to treat: empyema with effusion    Patient weight: 76.2 kg  Patient CrCl: ~72 ml/min    Will begin vancomycin: 1500mg every 18 hours    Vancomycin level ordered before 4th dose.     Pharmacy will continue to monitor and make adjustments as needed.      Thank you for the consult,    Erlinda Guerrero, PharmD  [phone]    Cruz Edge MD  Department of Hospital Medicine  [facility]

## 2024-12-19 NOTE — SUBJECTIVE & OBJECTIVE
Past Medical History:   Diagnosis Date    Hypertension        Past Surgical History:   Procedure Laterality Date    COLONOSCOPY      HERNIA REPAIR      x2       Review of patient's allergies indicates:  No Known Allergies    Family History       Problem Relation (Age of Onset)    Diabetes Mother, Sister, Brother    Heart attack Father    Stroke Father          Tobacco Use    Smoking status: Every Day     Current packs/day: 1.00     Types: Cigarettes    Smokeless tobacco: Never   Substance and Sexual Activity    Alcohol use: Yes     Alcohol/week: 42.0 standard drinks of alcohol     Types: 42 Cans of beer per week     Comment: socially    Drug use: Never    Sexual activity: Yes      Review of Systems  Objective:     Vital Signs (Most Recent):  Temp: 98 °F (36.7 °C) (12/19/24 1624)  Pulse: 87 (12/19/24 1624)  Resp: 18 (12/19/24 1624)  BP: 124/87 (12/19/24 1624)  SpO2: 96 % (12/19/24 1624) Vital Signs (24h Range):  Temp:  [97.8 °F (36.6 °C)-98.7 °F (37.1 °C)] 98 °F (36.7 °C)  Pulse:  [] 87  Resp:  [16-31] 18  SpO2:  [94 %-98 %] 96 %  BP: (121-161)/(79-99) 124/87   Weight: 76.2 kg (168 lb)  Body mass index is 25.54 kg/m².      Intake/Output Summary (Last 24 hours) at 12/19/2024 1650  Last data filed at 12/19/2024 0906  Gross per 24 hour   Intake --   Output 200 ml   Net -200 ml          Physical Exam  Constitutional:       General: He is not in acute distress.     Appearance: Normal appearance. He is normal weight. He is not ill-appearing or diaphoretic.   HENT:      Head: Normocephalic and atraumatic.      Nose: No congestion or rhinorrhea.      Mouth/Throat:      Mouth: Mucous membranes are moist.   Cardiovascular:      Rate and Rhythm: Normal rate and regular rhythm.      Pulses: Normal pulses.      Heart sounds: Normal heart sounds.   Pulmonary:      Effort: Pulmonary effort is normal. No respiratory distress.      Breath sounds: No stridor. No wheezing, rhonchi or rales.      Comments: Near absent breath  sounds left hemithorax  Chest:      Chest wall: No tenderness.   Abdominal:      General: Abdomen is flat.   Musculoskeletal:      Cervical back: Normal range of motion. No rigidity.      Right lower leg: No edema.      Left lower leg: No edema.   Skin:     General: Skin is warm.      Findings: No erythema.   Neurological:      General: No focal deficit present.      Mental Status: He is alert and oriented to person, place, and time. Mental status is at baseline.   Psychiatric:         Mood and Affect: Mood normal.         Behavior: Behavior normal.         Thought Content: Thought content normal.            Vents:  Oxygen Concentration (%): 28 (12/19/24 0806)  Lines/Drains/Airways       Peripheral Intravenous Line  Duration                  Peripheral IV - Single Lumen 12/18/24 1741 20 G Anterior;Left Forearm <1 day                  Significant Labs:    CBC/Anemia Profile:  Recent Labs   Lab 12/18/24 1739 12/19/24  0134   WBC 16.74* 16.97*   HGB 16.5 15.6   HCT 50.1 47.2   * 473*   MCV 91.6 91.3   RDW 12.9 12.7        Chemistries:  Recent Labs   Lab 12/18/24 1739 12/19/24  0134   * 133*   K 4.2 4.6    101   CO2 17* 20*   BUN 9 10   CREATININE 0.79 0.85   CALCIUM 9.4 9.0   ALBUMIN 3.2* 3.0*   PROT 7.9* 7.2   BILITOT 0.4 0.4   ALKPHOS 75 67   ALT 27 30   AST 31 28       All pertinent labs within the past 24 hours have been reviewed.    Significant Imaging: I have reviewed all pertinent imaging results/findings within the past 24 hours.

## 2024-12-19 NOTE — PLAN OF CARE
Problem: Adult Inpatient Plan of Care  Goal: Plan of Care Review  Outcome: Progressing  Goal: Patient-Specific Goal (Individualized)  Outcome: Progressing  Goal: Absence of Hospital-Acquired Illness or Injury  Outcome: Progressing  Goal: Optimal Comfort and Wellbeing  Outcome: Progressing  Goal: Readiness for Transition of Care  Outcome: Progressing     Problem: Violence Risk or Actual  Goal: Anger and Impulse Control  Outcome: Progressing     Problem: Sepsis/Septic Shock  Goal: Optimal Coping  Outcome: Progressing  Goal: Absence of Bleeding  Outcome: Progressing  Goal: Blood Glucose Level Within Targeted Range  Outcome: Progressing  Goal: Absence of Infection Signs and Symptoms  Outcome: Progressing  Goal: Optimal Nutrition Intake  Outcome: Progressing     Problem: Pneumonia  Goal: Fluid Balance  Outcome: Progressing  Goal: Resolution of Infection Signs and Symptoms  Outcome: Progressing  Goal: Effective Oxygenation and Ventilation  Outcome: Progressing

## 2024-12-19 NOTE — PROGRESS NOTES
[facility] - [address] [place] [address] Medicine  Progress Note    Patient Name: Isidoro Martinez  MRN: [id number]  Patient Class: IP- Inpatient   Admission Date: 12/18/2024  Length of Stay: 1 days  Attending Physician: Karl Rascon DO  Primary Care Provider: Gavino Miranda MD        Subjective     Principal Problem:Pleural effusion        HPI:  The patient, a 69-year-old, with a hx of HTN and Daily alcohol consumtion reported feeling unwell for the past three to four days, experiencing shortness of breath and cough productive of sputum with some color. The patient visited Dr. Malone, who performed a chest X-ray and referred the patient to the emergency room.  The patient mentioned a history of feeling funny about 2 weeks ago with sharp chest pains, He was evaluated by his PCP, which prompted an EKG that returned normal. The patient reported no history of similar respiratory issues. The patient did not report fever, chills, chest pain, or leg swelling. Patient endorses daily consumption of 6 pack of beer with last use 4 days ago.    In the ED initial vitals were /82, , T 98.5° F, SpO2 94% on room air.  Initial labs show WBC of 16.74 with a platelet count of 524 remainder of CBC is unremarkable CMP shows a sodium of 133 glucose of 154 otherwise unremarkable. CXR shows complete whiteout of the left hemithorax consistent with large pleural effusion.  Ct chest pending.    Patient will be admitted to hospital medicine service under the direct supervision of Dr ARREAGA for further evaluation and management.   IR drainage of pleural effusion with labs is ordered, however will consult Gen surgery as there is a large fluid collection outside of thorax that may communicate with pleural effusion. CT report pending    Overview/Hospital Course:  12/19 short of breath tachypneic today.  Plan for thoracentesis    Interval History:  No acute events overnight    Review of Systems  Objective:     Vital  Subjective   Vicki Scott is a 43 y.o. female.     Chief Complaint   Patient presents with   • lumbar back pain with radiculopathy affecting left lower ext       She presents for follow up of low back pain. She states she was doing physical therapy that was stopped on the 25th. She states her worker's comp wouldn't pay for any more visits. She states she was doing good until the physical therapy stopped then the pain returned. She states it feels like someone is punching her in her middle low back and on the right side. She denies radiation. She states it is intermittent. She states the nabumetone doesn't really help. She states the flexeril helps some. She states it hurt so bad last night. She wanted to go to the hospital to get a shot.          The following portions of the patient's history were reviewed and updated as appropriate: allergies, current medications, past family history, past medical history, past social history, past surgical history and problem list.    Review of Systems   Constitutional: Negative for fatigue, fever and unexpected weight change.   HENT: Negative for ear pain, rhinorrhea and sore throat.    Eyes: Negative for visual disturbance.   Respiratory: Negative for cough, chest tightness and shortness of breath.    Cardiovascular: Negative for chest pain, palpitations and leg swelling.   Gastrointestinal: Negative for abdominal pain, blood in stool, constipation, diarrhea, nausea and vomiting.   Endocrine: Negative for cold intolerance and heat intolerance.   Genitourinary: Negative for dysuria.   Musculoskeletal: Positive for arthralgias, back pain and myalgias.   Skin: Negative for color change.   Allergic/Immunologic: Negative for environmental allergies.   Hematological: Negative for adenopathy.   Psychiatric/Behavioral: Negative for suicidal ideas. The patient is not nervous/anxious.        Objective     /98 (BP Location: Left arm, Patient Position: Sitting, Cuff Size: Adult)    "Pulse 81   Temp 97.2 °F (36.2 °C) (Oral)   Ht 167.6 cm (65.98\")   Wt 81.1 kg (178 lb 12.8 oz)   SpO2 99%   BMI 28.87 kg/m²     Physical Exam   Constitutional: She is oriented to person, place, and time. She appears well-developed and well-nourished. No distress.   HENT:   Head: Normocephalic and atraumatic.   Cardiovascular: Normal rate, regular rhythm, S1 normal, S2 normal, normal heart sounds and intact distal pulses. Exam reveals no gallop and no friction rub.   No murmur heard.  Pulmonary/Chest: Effort normal and breath sounds normal. No respiratory distress.   Abdominal: Soft. Normal appearance and bowel sounds are normal. She exhibits no distension. There is no tenderness.   Musculoskeletal: She exhibits no edema.   Neurological: She is alert and oriented to person, place, and time.   Skin: Skin is warm and dry. She is not diaphoretic.   Psychiatric: She has a normal mood and affect. Her behavior is normal. Judgment and thought content normal.   Vitals reviewed.      Assessment/Plan     Problem List Items Addressed This Visit     None      Visit Diagnoses     Lumbar back pain with radiculopathy affecting left lower extremity        Relevant Medications    cyclobenzaprine (FLEXERIL) 10 MG tablet    lidocaine (LIDODERM) 5 %          Plan: Lidoderm patches ordered for low back pain. No work. No pulling, tugging or lifting. Alternate ice and heat. Plan to get MRI since pain is persistent and worsening. Follow up in one month.    Patient's Body mass index is 28.87 kg/m². BMI is above normal parameters. Recommendations include: educational material.   (Normal BMI:  18.5-24.9, OW 25-29.9, Obesity 30 or greater)    Vicki Scott is a current cigarettes user.  She currently smokes 1 pack of cigarettes per day for a duration of 30 years. I have educated her on the risk of diseases from using tobacco products such as cancer, COPD and heart diease.     I advised her to quit and she is not willing to quit.    I spent 2 " Signs (Most Recent):  Temp: 97.8 °F (36.6 °C) (12/19/24 0742)  Pulse: 109 (12/19/24 0806)  Resp: 18 (12/19/24 0806)  BP: (!) 135/91 (12/19/24 0742)  SpO2: 98 % (12/19/24 0806) Vital Signs (24h Range):  Temp:  [97.3 °F (36.3 °C)-98.7 °F (37.1 °C)] 97.8 °F (36.6 °C)  Pulse:  [] 109  Resp:  [16-31] 18  SpO2:  [94 %-98 %] 98 %  BP: (124-161)/(82-99) 135/91     Weight: 76.2 kg (168 lb)  Body mass index is 25.54 kg/m².    Intake/Output Summary (Last 24 hours) at 12/19/2024 1106  Last data filed at 12/19/2024 0906  Gross per 24 hour   Intake --   Output 200 ml   Net -200 ml         Physical Exam  Vitals and nursing note reviewed.   Constitutional:       Appearance: Normal appearance. He is ill-appearing.   HENT:      Nose: Nose normal.      Mouth/Throat:      Mouth: Mucous membranes are moist.   Eyes:      Pupils: Pupils are equal, round, and reactive to light.   Cardiovascular:      Rate and Rhythm: Normal rate and regular rhythm.      Heart sounds: Normal heart sounds.   Pulmonary:      Effort: Respiratory distress present.      Comments: No breath sounds on left  Abdominal:      Palpations: Abdomen is soft.   Musculoskeletal:         General: Normal range of motion.      Cervical back: Neck supple.   Skin:     General: Skin is warm and dry.   Neurological:      Mental Status: He is alert and oriented to person, place, and time.             Significant Labs: All pertinent labs within the past 24 hours have been reviewed.    Significant Imaging: I have reviewed all pertinent imaging results/findings within the past 24 hours.    Assessment and Plan     * Pleural effusion    On CT of chest with contrast, patient was found to have a large left pleural effusion with complete collapse of the left lung and a 2 cm ground-glass opacification within the right lower lobe. A thoracentesis was ordered. Most likely etiology includes  parapneumonic effusion vs pneumonia vs malignancy . Will consult IR for drainage.     Pleural  fluid labs/micro ordered  Empiric coverage with Vanc and Zosyn  Consult IR for thoracentesis    Thoracentesis today      Sepsis due to pneumonia    This patient does have evidence of infective focus  My overall impression is sepsis.  Source: Respiratory  Antibiotics given-   Antibiotics (72h ago, onward)      Start     Stop Route Frequency Ordered    12/18/24 2300  vancomycin (VANCOCIN) 1,500 mg in 0.9% NaCl 250 mL IVPB         -- IV Every 18 hours 12/18/24 2127 12/18/24 2226  vancomycin - pharmacy to dose         -- IV pharmacy to manage frequency 12/18/24 2127 12/18/24 2200  piperacillin-tazobactam (ZOSYN) 4.5 g in D5W 100 mL IVPB (MB+)         -- IV Every 8 hours (non-standard times) 12/18/24 2049            Fluid challenge Not needed - patient is not hypotensive      Post- resuscitation assessment No - Post resuscitation assessment not needed       Will Not start Pressors- Levophed for MAP of 65  Source control achieved by: Empiric ABX    Alcohol dependency  Pt consumes at least 1 6 pk of Beer daily  Last drink 4 days ago  DT Precautions  CIWA Q shift  Ativan q 2 prn seizures, severe agitation  Thiamine and folate supplementation    CIWA 4    Hypertension    Patient's blood pressure range in the last 24 hours was: BP  Min: 124/82  Max: 161/99.The patient's inpatient anti-hypertensive regimen is listed below:    Current Antihypertensives    amLODIPine tablet 10 mg, Daily, Oral    Plan    - BP is controlled, no changes needed to their regimen  - follow      VTE Risk Mitigation (From admission, onward)           Ordered     Reason for No Pharmacological VTE Prophylaxis  Once        Question:  Reasons:  Answer:  Physician Provided (leave comment)  Comment:  Procedure in AM (thoracentesis)    12/18/24 1948     Place SIS hose  Until discontinued         12/18/24 1948     IP VTE LOW RISK PATIENT  Once         12/18/24 1948                    Discharge Planning   RAHAT:      Code Status: Full Code   Medical  minutes counseling the patient.          Understands disease processes and need for medications.  Understands reasons for urgent and emergent care.  Patient (& family) verbalized agreement for treatment plan.   Emotional support and active listening provided.  Patient provided time to verbalize feelings.               This document has been electronically signed by WILLIAM Watson   October 4, 2019 11:38 AM   Readiness for Discharge Date:            Labs and outside records reviewed.  Metabolic panel in the morning.  Chest x-ray reviewed and independently interpreted left-sided hemithorax.                Karl Rascon DO  Department of Hospital Medicine   [facility]

## 2024-12-19 NOTE — ASSESSMENT & PLAN NOTE
Patient's blood pressure range in the last 24 hours was: BP  Min: 124/82  Max: 153/90.The patient's inpatient anti-hypertensive regimen is listed below:  Current Antihypertensives  , Daily, Oral  amLODIPine tablet 10 mg, Daily, Oral    Plan  - BP is controlled, no changes needed to their regimen  -

## 2024-12-19 NOTE — H&P
"  [facility] Hospital Medicine  History & Physical    Patient Name: Isidoro Martinez  MRN: [id number]  Patient Class: IP- Inpatient  Admission Date: 12/18/2024  Attending Physician: {ATTENDING PROVIDER:[id number]}  Primary Care Provider: Gavino Miranda MD         Patient information was obtained from {info source:[id number]::"ER records"}.     Subjective:     Principal Problem:Pleural effusion    Chief Complaint:   Chief Complaint   Patient presents with    Shortness of Breath     Patient presents to ED from Dr. Malone's office with c/o SOB x 3-4 days.  Patient states he was just diagnosed with pneumonia.          HPI: The patient, a 69-year-old, with a hx of HTN and Daily alcohol consumtion reported feeling unwell for the past three to four days, experiencing shortness of breath and cough productive of sputum with some color. The patient visited Dr. Malone, who performed a chest X-ray and referred the patient to the emergency room.  The patient mentioned a history of feeling funny about 2 weeks ago with sharp chest pains, He was evaluated by his PCP, which prompted an EKG that returned normal. The patient reported no history of similar respiratory issues. The patient did not report fever, chills, chest pain, or leg swelling. Patient endorses daily consumption of 6 pack of beer with last use 4 days ago.    In the ED initial vitals were /82, , T 98.5° F, SpO2 94% on room air.  Initial labs show WBC of 16.74 with a platelet count of 524 remainder of CBC is unremarkable CMP shows a sodium of 133 glucose of 154 otherwise unremarkable. CXR shows complete whiteout of the left hemithorax consistent with large pleural effusion.  Ct chest pending.    Patient will be admitted to hospital medicine service under the direct supervision of Dr ARREAGA for further evaluation and management.   IR drainage of pleural effusion with labs is ordered, however will consult Gen surgery as there is a large " fluid collection outside of thorax that may communicate with pleural effusion. CT report pending    No new subjective & objective note has been filed under this hospital service since the last note was generated.    Assessment/Plan:     * Pleural effusion  On CT of chest with contrast, Patient was found to have a large left pleural effusion with complete collapse of the left lung and 2 cm ground-glass opacification within the right lower lobe  A thoracentesis was ordered. Most likely etiology includes  Empyema vs pneumonia vs malignancy . Management to include  Will consult IR for drainage, Pleural fluid labs/micro ordered, consult Gen surgery for fluid collection external thorax with possible communication    Empiric coverage with Vanc and Zosyn  IR for drainage ordered  Consult General surgery      Sepsis due to pneumonia    This patient does have evidence of infective focus  My overall impression is sepsis.  Source: Respiratory  Antibiotics given-   Antibiotics (72h ago, onward)      Start     Stop Route Frequency Ordered    12/18/24 2300  vancomycin (VANCOCIN) 1,500 mg in 0.9% NaCl 250 mL IVPB         -- IV Every 18 hours 12/18/24 2127 12/18/24 2226  vancomycin - pharmacy to dose         -- IV pharmacy to manage frequency 12/18/24 2127 12/18/24 2200  piperacillin-tazobactam (ZOSYN) 4.5 g in D5W 100 mL IVPB (MB+)         -- IV Every 8 hours (non-standard times) 12/18/24 2049            Fluid challenge Not needed - patient is not hypotensive      Post- resuscitation assessment No - Post resuscitation assessment not needed       Will Not start Pressors- Levophed for MAP of 65  Source control achieved by: Empiric ABX    Alcohol dependency  Pt consumes at least 1 6 pk of Beer daily  Last drink 4 days ago  DT Precautions  CIWA Q shift  Ativan q 2 prn seizures, severe agitation        Hypertension  Patient's blood pressure range in the last 24 hours was: BP  Min: 124/82  Max: 153/90.The patient's inpatient  anti-hypertensive regimen is listed below:  Current Antihypertensives  , Daily, Oral  amLODIPine tablet 10 mg, Daily, Oral    Plan  - BP is controlled, no changes needed to their regimen  -       VTE Risk Mitigation (From admission, onward)           Ordered     Reason for No Pharmacological VTE Prophylaxis  Once        Question:  Reasons:  Answer:  Physician Provided (leave comment)  Comment:  Procedure in AM (thoracentesis)    12/18/24 1948     Place SIS hose  Until discontinued         12/18/24 1948     IP VTE LOW RISK PATIENT  Once         12/18/24 1948                               Pharmacy consulted to assist with the management of vancomycin in this patient to treat: empyema with effusion    Patient weight: 76.2 kg  Patient CrCl: ~72 ml/min    Will begin vancomycin: 1500mg every 18 hours    Vancomycin level ordered before 4th dose.     Pharmacy will continue to monitor and make adjustments as needed.      Thank you for the consult,    Erlinda Guerrero, PharmD  [phone]    Cruz Edge MD  Department of Hospital Medicine  [facility]

## 2024-12-19 NOTE — ASSESSMENT & PLAN NOTE
Patient's blood pressure range in the last 24 hours was: BP  Min: 124/82  Max: 161/99.The patient's inpatient anti-hypertensive regimen is listed below:    Current Antihypertensives    amLODIPine tablet 10 mg, Daily, Oral    Plan    - BP is controlled, no changes needed to their regimen  - follow

## 2024-12-19 NOTE — SUBJECTIVE & OBJECTIVE
Past Medical History:   Diagnosis Date    Hypertension        Past Surgical History:   Procedure Laterality Date    COLONOSCOPY      HERNIA REPAIR      x2       Review of patient's allergies indicates:  No Known Allergies    Current Facility-Administered Medications on File Prior to Encounter   Medication    [COMPLETED] cefTRIAXone injection 500 mg    [COMPLETED] dexAMETHasone injection 4 mg     Current Outpatient Medications on File Prior to Encounter   Medication Sig    amLODIPine (NORVASC) 10 MG tablet Take 10 mg by mouth once daily.    meloxicam (MOBIC) 15 MG tablet TAKE 1 TABLET(15 MG) BY MOUTH EVERY DAY    [DISCONTINUED] amLODIPine (NORVASC) 5 MG tablet TAKE 1 TABLET(5 MG) BY MOUTH EVERY DAY    [DISCONTINUED] cetirizine (ZYRTEC) 10 MG tablet Take 1 tablet (10 mg total) by mouth once daily. (Patient not taking: Reported on 12/18/2024)    [DISCONTINUED] diclofenac sodium (VOLTAREN) 1 % Gel Apply 2 g topically 3 (three) times daily as needed. (Patient not taking: Reported on 12/18/2024)     Family History       Problem Relation (Age of Onset)    Diabetes Mother, Sister, Brother    Heart attack Father    Stroke Father          Tobacco Use    Smoking status: Every Day     Current packs/day: 1.00     Types: Cigarettes    Smokeless tobacco: Never   Substance and Sexual Activity    Alcohol use: Yes     Alcohol/week: 42.0 standard drinks of alcohol     Types: 42 Cans of beer per week     Comment: socially    Drug use: Never    Sexual activity: Yes     Review of Systems   Constitutional:  Positive for activity change. Negative for appetite change, chills, fatigue and fever.   HENT:  Negative for congestion and trouble swallowing.    Eyes:  Negative for visual disturbance.   Respiratory:  Positive for cough, chest tightness and shortness of breath. Negative for wheezing.    Cardiovascular:  Positive for chest pain. Negative for palpitations and leg swelling.   Gastrointestinal:  Negative for abdominal pain, blood in  stool, constipation, diarrhea, nausea and vomiting.   Genitourinary:  Negative for dysuria and hematuria.   Skin:  Negative for rash.   Neurological:  Negative for dizziness, weakness and light-headedness.     Objective:     Vital Signs (Most Recent):  Temp: 98.5 °F (36.9 °C) (12/18/24 1720)  Pulse: (!) 117 (12/18/24 2007)  Resp: (!) 26 (12/18/24 2007)  BP: (!) 153/90 (12/18/24 2001)  SpO2: 97 % (12/18/24 2007) Vital Signs (24h Range):  Temp:  [97.3 °F (36.3 °C)-98.5 °F (36.9 °C)] 98.5 °F (36.9 °C)  Pulse:  [115-130] 117  Resp:  [20-31] 26  SpO2:  [94 %-97 %] 97 %  BP: (124-153)/(82-93) 153/90     Weight: 76.2 kg (168 lb)  Body mass index is 25.54 kg/m².     Physical Exam  Vitals and nursing note reviewed.   Constitutional:       General: He is not in acute distress.     Appearance: Normal appearance. He is ill-appearing. He is not toxic-appearing or diaphoretic.   HENT:      Nose: Nose normal.      Mouth/Throat:      Mouth: Mucous membranes are moist.   Eyes:      Pupils: Pupils are equal, round, and reactive to light.   Cardiovascular:      Rate and Rhythm: Normal rate and regular rhythm.      Heart sounds: Normal heart sounds.   Pulmonary:      Effort: Pulmonary effort is normal.      Comments: No breath sounds on left  Abdominal:      Palpations: Abdomen is soft.   Musculoskeletal:         General: Normal range of motion.      Cervical back: Neck supple.      Right lower leg: No edema.      Left lower leg: No edema.   Skin:     General: Skin is warm and dry.   Neurological:      Mental Status: He is alert and oriented to person, place, and time.              CRANIAL NERVES     CN III, IV, VI   Pupils are equal, round, and reactive to light.       Significant Labs: All pertinent labs within the past 24 hours have been reviewed.    Significant Imaging: I have reviewed all pertinent imaging results/findings within the past 24 hours.

## 2024-12-20 LAB
ALBUMIN SERPL BCP-MCNC: 2.9 G/DL (ref 3.4–4.8)
ALBUMIN/GLOB SERPL: 0.9 {RATIO}
ALP SERPL-CCNC: 65 U/L (ref 40–150)
ALT SERPL W P-5'-P-CCNC: 28 U/L
ANION GAP SERPL CALCULATED.3IONS-SCNC: 16 MMOL/L (ref 7–16)
AST SERPL W P-5'-P-CCNC: 25 U/L (ref 5–34)
BASOPHILS # BLD AUTO: 0.04 K/UL (ref 0–0.2)
BASOPHILS NFR BLD AUTO: 0.3 % (ref 0–1)
BILIRUB SERPL-MCNC: 0.6 MG/DL
BUN SERPL-MCNC: 10 MG/DL (ref 8–26)
BUN/CREAT SERPL: 13 (ref 6–20)
CALCIUM SERPL-MCNC: 8.4 MG/DL (ref 8.8–10)
CHLORIDE SERPL-SCNC: 101 MMOL/L (ref 98–107)
CO2 SERPL-SCNC: 23 MMOL/L (ref 23–31)
CREAT SERPL-MCNC: 0.78 MG/DL (ref 0.72–1.25)
DIFFERENTIAL METHOD BLD: ABNORMAL
EGFR (NO RACE VARIABLE) (RUSH/TITUS): 97 ML/MIN/1.73M2
EOSINOPHIL # BLD AUTO: 0.07 K/UL (ref 0–0.5)
EOSINOPHIL NFR BLD AUTO: 0.4 % (ref 1–4)
ERYTHROCYTE [DISTWIDTH] IN BLOOD BY AUTOMATED COUNT: 12.8 % (ref 11.5–14.5)
GLOBULIN SER-MCNC: 3.3 G/DL (ref 2–4)
GLUCOSE FLD-MCNC: 81 MG/DL
GLUCOSE SERPL-MCNC: 118 MG/DL (ref 82–115)
HCT VFR BLD AUTO: 44.4 % (ref 40–54)
HGB BLD-MCNC: 14.8 G/DL (ref 13.5–18)
IMM GRANULOCYTES # BLD AUTO: 0.17 K/UL (ref 0–0.04)
IMM GRANULOCYTES NFR BLD: 1.1 % (ref 0–0.4)
LYMPHOCYTES # BLD AUTO: 1.43 K/UL (ref 1–4.8)
LYMPHOCYTES NFR BLD AUTO: 9.1 % (ref 27–41)
MAGNESIUM SERPL-MCNC: 2.4 MG/DL (ref 1.6–2.6)
MCH RBC QN AUTO: 30 PG (ref 27–31)
MCHC RBC AUTO-ENTMCNC: 33.3 G/DL (ref 32–36)
MCV RBC AUTO: 90.1 FL (ref 80–96)
MONOCYTES # BLD AUTO: 1.82 K/UL (ref 0–0.8)
MONOCYTES NFR BLD AUTO: 11.6 % (ref 2–6)
MPC BLD CALC-MCNC: 9.2 FL (ref 9.4–12.4)
NEUTROPHILS # BLD AUTO: 12.16 K/UL (ref 1.8–7.7)
NEUTROPHILS NFR BLD AUTO: 77.5 % (ref 53–65)
NRBC # BLD AUTO: 0 X10E3/UL
NRBC, AUTO (.00): 0 %
PHOSPHATE SERPL-MCNC: 4.1 MG/DL (ref 2.3–4.7)
PLATELET # BLD AUTO: 499 K/UL (ref 150–400)
POTASSIUM SERPL-SCNC: 4.7 MMOL/L (ref 3.5–5.1)
PROT SERPL-MCNC: 6.2 G/DL (ref 5.8–7.6)
RBC # BLD AUTO: 4.93 M/UL (ref 4.6–6.2)
SODIUM SERPL-SCNC: 135 MMOL/L (ref 136–145)
WBC # BLD AUTO: 15.69 K/UL (ref 4.5–11)

## 2024-12-20 PROCEDURE — 25500020 PHARM REV CODE 255: Performed by: STUDENT IN AN ORGANIZED HEALTH CARE EDUCATION/TRAINING PROGRAM

## 2024-12-20 PROCEDURE — 63600175 PHARM REV CODE 636 W HCPCS: Performed by: INTERNAL MEDICINE

## 2024-12-20 PROCEDURE — 25000242 PHARM REV CODE 250 ALT 637 W/ HCPCS: Performed by: NURSE PRACTITIONER

## 2024-12-20 PROCEDURE — 83735 ASSAY OF MAGNESIUM: CPT | Performed by: INTERNAL MEDICINE

## 2024-12-20 PROCEDURE — 11000001 HC ACUTE MED/SURG PRIVATE ROOM

## 2024-12-20 PROCEDURE — 27000221 HC OXYGEN, UP TO 24 HOURS

## 2024-12-20 PROCEDURE — 84100 ASSAY OF PHOSPHORUS: CPT | Performed by: INTERNAL MEDICINE

## 2024-12-20 PROCEDURE — 99233 SBSQ HOSP IP/OBS HIGH 50: CPT | Mod: ,,, | Performed by: STUDENT IN AN ORGANIZED HEALTH CARE EDUCATION/TRAINING PROGRAM

## 2024-12-20 PROCEDURE — 99900035 HC TECH TIME PER 15 MIN (STAT)

## 2024-12-20 PROCEDURE — 25000003 PHARM REV CODE 250: Performed by: NURSE PRACTITIONER

## 2024-12-20 PROCEDURE — 94761 N-INVAS EAR/PLS OXIMETRY MLT: CPT

## 2024-12-20 PROCEDURE — 63600175 PHARM REV CODE 636 W HCPCS: Performed by: NURSE PRACTITIONER

## 2024-12-20 PROCEDURE — 25000003 PHARM REV CODE 250: Performed by: INTERNAL MEDICINE

## 2024-12-20 PROCEDURE — 85025 COMPLETE CBC W/AUTO DIFF WBC: CPT | Performed by: NURSE PRACTITIONER

## 2024-12-20 PROCEDURE — 80053 COMPREHEN METABOLIC PANEL: CPT | Performed by: NURSE PRACTITIONER

## 2024-12-20 PROCEDURE — 94640 AIRWAY INHALATION TREATMENT: CPT

## 2024-12-20 PROCEDURE — 36415 COLL VENOUS BLD VENIPUNCTURE: CPT | Performed by: NURSE PRACTITIONER

## 2024-12-20 RX ORDER — IOPAMIDOL 755 MG/ML
100 INJECTION, SOLUTION INTRAVASCULAR
Status: COMPLETED | OUTPATIENT
Start: 2024-12-20 | End: 2024-12-20

## 2024-12-20 RX ORDER — DIATRIZOATE MEGLUMINE AND DIATRIZOATE SODIUM 660; 100 MG/ML; MG/ML
60 SOLUTION ORAL; RECTAL
Status: COMPLETED | OUTPATIENT
Start: 2024-12-20 | End: 2024-12-20

## 2024-12-20 RX ADMIN — FOLIC ACID 1 MG: 1 TABLET ORAL at 10:12

## 2024-12-20 RX ADMIN — IPRATROPIUM BROMIDE AND ALBUTEROL SULFATE 3 ML: .5; 3 SOLUTION RESPIRATORY (INHALATION) at 01:12

## 2024-12-20 RX ADMIN — THIAMINE HYDROCHLORIDE 400 MG: 100 INJECTION, SOLUTION INTRAMUSCULAR; INTRAVENOUS at 04:12

## 2024-12-20 RX ADMIN — THIAMINE HYDROCHLORIDE 400 MG: 100 INJECTION, SOLUTION INTRAMUSCULAR; INTRAVENOUS at 08:12

## 2024-12-20 RX ADMIN — SENNOSIDES AND DOCUSATE SODIUM 1 TABLET: 50; 8.6 TABLET ORAL at 10:12

## 2024-12-20 RX ADMIN — IPRATROPIUM BROMIDE AND ALBUTEROL SULFATE 3 ML: .5; 3 SOLUTION RESPIRATORY (INHALATION) at 12:12

## 2024-12-20 RX ADMIN — IOPAMIDOL 100 ML: 755 INJECTION, SOLUTION INTRAVENOUS at 02:12

## 2024-12-20 RX ADMIN — PIPERACILLIN SODIUM AND TAZOBACTAM SODIUM 4.5 G: 4; .5 INJECTION, POWDER, LYOPHILIZED, FOR SOLUTION INTRAVENOUS at 09:12

## 2024-12-20 RX ADMIN — AMLODIPINE BESYLATE 10 MG: 10 TABLET ORAL at 10:12

## 2024-12-20 RX ADMIN — HYDROCODONE BITARTRATE AND ACETAMINOPHEN 1 TABLET: 5; 325 TABLET ORAL at 06:12

## 2024-12-20 RX ADMIN — TRAZODONE HYDROCHLORIDE 25 MG: 50 TABLET ORAL at 09:12

## 2024-12-20 RX ADMIN — PIPERACILLIN SODIUM AND TAZOBACTAM SODIUM 4.5 G: 4; .5 INJECTION, POWDER, LYOPHILIZED, FOR SOLUTION INTRAVENOUS at 06:12

## 2024-12-20 RX ADMIN — DIATRIZOATE MEGLUMINE AND DIATRIZOATE SODIUM 60 ML: 600; 100 SOLUTION ORAL; RECTAL at 02:12

## 2024-12-20 RX ADMIN — THIAMINE HYDROCHLORIDE 400 MG: 100 INJECTION, SOLUTION INTRAMUSCULAR; INTRAVENOUS at 10:12

## 2024-12-20 RX ADMIN — PIPERACILLIN SODIUM AND TAZOBACTAM SODIUM 4.5 G: 4; .5 INJECTION, POWDER, LYOPHILIZED, FOR SOLUTION INTRAVENOUS at 04:12

## 2024-12-20 RX ADMIN — IPRATROPIUM BROMIDE AND ALBUTEROL SULFATE 3 ML: .5; 3 SOLUTION RESPIRATORY (INHALATION) at 07:12

## 2024-12-20 NOTE — HOSPITAL COURSE
12/20/24   The patient had 2100 cc of immediate drainage after chest tube placement yesterday.  Overnight, the chest tube was unclamped again at around midnight with a total drainage of 800 cc this morning.  I attempted additional flushing and drainage at bedside today with no significant amount of fluid.  I reviewed the patient's chest x-ray from this morning which showed near-complete resolution of left-sided pleural effusion with evidence of basilar atelectasis present in the left lower lobe.  Pleural fluid analysis consistent with exudative effusion.  Cytology pending.    12/21/24:  I reviewed the patient's chest imaging which showed re-expansion of his left lung.  There were suspicious appearing opacification left upper lobe.  He also has ongoing consolidation left lower lobe with stenosis takeoff of left lower lobe concerning for endobronchial lesion versus infection.  Cytology still pending.  Chest tube removed at bedside today.

## 2024-12-20 NOTE — ASSESSMENT & PLAN NOTE
On CT of chest with contrast, patient was found to have a large left pleural effusion with complete collapse of the left lung and a 2 cm ground-glass opacification within the right lower lobe. A thoracentesis was ordered. Most likely etiology includes  parapneumonic effusion vs pneumonia vs malignancy . Will consult IR for drainage.     Pleural fluid labs/micro ordered  Empiric coverage with Vanc and Zosyn  Consult IR for thoracentesis    Thoracentesis today  Marked improvement in aeration of the left lung.  Chest x-ray reviewed and independently interpreted.

## 2024-12-20 NOTE — NURSING
1700 Pt being transported to ICU for chest tube placement per Dr Saleem. Pt is awake, alert and oriented. 2L/NC, NADN.    1825 Received pt from ICU with left chest tube clamped. Pt has no complaints. 2L/NC. Safety measures maintained. Will monitor.

## 2024-12-20 NOTE — ASSESSMENT & PLAN NOTE
This patient does have evidence of infective focus  My overall impression is sepsis.  Source: Respiratory  Antibiotics given-   Antibiotics (72h ago, onward)      Start     Stop Route Frequency Ordered    12/18/24 2200  piperacillin-tazobactam (ZOSYN) 4.5 g in D5W 100 mL IVPB (MB+)         -- IV Every 8 hours (non-standard times) 12/18/24 2049            Fluid challenge Not needed - patient is not hypotensive      Post- resuscitation assessment No - Post resuscitation assessment not needed       Will Not start Pressors- Levophed for MAP of 65  Source control achieved by: Empiric ABX  Continue antibiotics.  Discussed case with Dr. Saleem believes this may be malignancy.  CT chest abdomen pelvis

## 2024-12-20 NOTE — SUBJECTIVE & OBJECTIVE
Interval History/Significant Events:  Refer to hospital course    Review of Systems  Objective:     Vital Signs (Most Recent):  Temp: 98.1 °F (36.7 °C) (12/20/24 0729)  Pulse: 88 (12/20/24 0745)  Resp: 16 (12/20/24 0745)  BP: 108/70 (12/20/24 0729)  SpO2: 98 % (12/20/24 0745) Vital Signs (24h Range):  Temp:  [97.8 °F (36.6 °C)-98.7 °F (37.1 °C)] 98.1 °F (36.7 °C)  Pulse:  [] 88  Resp:  [16-29] 16  SpO2:  [95 %-99 %] 98 %  BP: (108-133)/(70-90) 108/70   Weight: 76.2 kg (168 lb)  Body mass index is 25.54 kg/m².      Intake/Output Summary (Last 24 hours) at 12/20/2024 1046  Last data filed at 12/20/2024 1005  Gross per 24 hour   Intake 764.98 ml   Output 3940 ml   Net -3175.02 ml          Physical Exam  Constitutional:       General: He is not in acute distress.     Appearance: Normal appearance. He is normal weight. He is not ill-appearing or diaphoretic.   HENT:      Head: Normocephalic and atraumatic.      Nose: No congestion or rhinorrhea.      Mouth/Throat:      Mouth: Mucous membranes are moist.   Cardiovascular:      Rate and Rhythm: Normal rate and regular rhythm.      Pulses: Normal pulses.      Heart sounds: Normal heart sounds.   Pulmonary:      Effort: Pulmonary effort is normal. No respiratory distress.      Breath sounds: No stridor. No wheezing, rhonchi or rales.      Comments: Improved breath sounds this morning left apex  Chest:      Chest wall: No tenderness.   Abdominal:      General: Abdomen is flat.   Musculoskeletal:      Cervical back: Normal range of motion. No rigidity.      Right lower leg: No edema.      Left lower leg: No edema.   Skin:     General: Skin is warm.      Findings: No erythema.   Neurological:      General: No focal deficit present.      Mental Status: He is alert and oriented to person, place, and time. Mental status is at baseline.   Psychiatric:         Mood and Affect: Mood normal.         Behavior: Behavior normal.         Thought Content: Thought content normal.             Vents:  Oxygen Concentration (%): 28 (12/20/24 0013)  Lines/Drains/Airways       Drain  Duration                  Chest Tube 12/19/24 1750 Left <1 day              Peripheral Intravenous Line  Duration                  Peripheral IV - Single Lumen 12/18/24 1741 20 G Anterior;Left Forearm 1 day                  Significant Labs:    CBC/Anemia Profile:  Recent Labs   Lab 12/18/24 1739 12/19/24 0134 12/20/24  0435   WBC 16.74* 16.97* 15.69*   HGB 16.5 15.6 14.8   HCT 50.1 47.2 44.4   * 473* 499*   MCV 91.6 91.3 90.1   RDW 12.9 12.7 12.8        Chemistries:  Recent Labs   Lab 12/18/24 1739 12/19/24 0134 12/20/24  0435   * 133* 135*   K 4.2 4.6 4.7    101 101   CO2 17* 20* 23   BUN 9 10 10   CREATININE 0.79 0.85 0.78   CALCIUM 9.4 9.0 8.4*   ALBUMIN 3.2* 3.0* 2.9*   PROT 7.9* 7.2 6.2   BILITOT 0.4 0.4 0.6   ALKPHOS 75 67 65   ALT 27 30 28   AST 31 28 25   MG  --   --  2.4   PHOS  --   --  4.1       All pertinent labs within the past 24 hours have been reviewed.    Significant Imaging:  I have reviewed all pertinent imaging results/findings within the past 24 hours.

## 2024-12-20 NOTE — ASSESSMENT & PLAN NOTE
Status post left tube thoracostomy with pleural fluid analysis indicative of exudative effusion.  This is not an empyema, although parapneumonic effusion is on the differential.  The patient does not require mucolytic/thrombolytic therapy through the chest tube as there was no evidence of loculations and follow up chest x-ray shown significant improvement in his pleural effusion.  Continues to be on -40 cc on his chest tube.      Pending CT chest to evaluate the left parenchymal space.  Have also requested CT abdomen and pelvis to rule out any underlying lesion, patient's pleural fluid cytology still pending at this time.  Significantly improved in terms of symptoms this morning.      Pending CT chest, may consider removal of chest tube later today or tomorrow morning.

## 2024-12-20 NOTE — ASSESSMENT & PLAN NOTE
Patient's blood pressure range in the last 24 hours was: BP  Min: 108/70  Max: 133/90.The patient's inpatient anti-hypertensive regimen is listed below:    Current Antihypertensives    amLODIPine tablet 10 mg, Daily, Oral    Plan    - BP is controlled, no changes needed to their regimen  - follow  BP okay

## 2024-12-20 NOTE — PROGRESS NOTES
[facility] - [facility]  Critical Care Medicine  Progress Note    Patient Name: Isidoro Martinez  MRN: [id number]  Admission Date: 12/18/2024  Hospital Length of Stay: 2 days  Code Status: Full Code  Attending Provider: Karl Rascon DO  Primary Care Provider: Gavino Miranda MD   Principal Problem: Pleural effusion    Subjective:     HPI:  This is a 69-year-old gentleman with past medical history significant for hypertension who presented to the hospital on 12/1924 in the setting of shortness of breath, cough which was productive.      I personally reviewed the patient's imaging (chest x-ray which showed complete opacification of left hemithorax.  There were some evidence of air bronchograms.  He also had a CT chest with contrast performed in the evening of 12/18/24 which showed evidence of a large left-sided pleural effusion with the associated compressive atelectasis along with mediastinal shift towards the contralateral side.  When I saw the patient at bedside today, he was saturating at 96% on 2 L nasal cannula.  He had some dyspnea was was still able to speak in complete sentences.  He denies any significant weight loss or hemoptysis.    Hospital/ICU Course:  12/20/24   The patient had 2100 cc of immediate drainage after chest tube placement yesterday.  Overnight, the chest tube was unclamped again at around midnight with a total drainage of 800 cc this morning.  I attempted additional flushing and drainage at bedside today with no significant amount of fluid.  I reviewed the patient's chest x-ray from this morning which showed near-complete resolution of left-sided pleural effusion with evidence of basilar atelectasis present in the left lower lobe.  Pleural fluid analysis consistent with exudative effusion.  Cytology pending.      Interval History/Significant Events:  Refer to hospital course    Review of Systems  Objective:     Vital Signs (Most Recent):  Temp: 98.1 °F (36.7 °C)  (12/20/24 0729)  Pulse: 88 (12/20/24 0745)  Resp: 16 (12/20/24 0745)  BP: 108/70 (12/20/24 0729)  SpO2: 98 % (12/20/24 0745) Vital Signs (24h Range):  Temp:  [97.8 °F (36.6 °C)-98.7 °F (37.1 °C)] 98.1 °F (36.7 °C)  Pulse:  [] 88  Resp:  [16-29] 16  SpO2:  [95 %-99 %] 98 %  BP: (108-133)/(70-90) 108/70   Weight: 76.2 kg (168 lb)  Body mass index is 25.54 kg/m².      Intake/Output Summary (Last 24 hours) at 12/20/2024 1046  Last data filed at 12/20/2024 1005  Gross per 24 hour   Intake 764.98 ml   Output 3940 ml   Net -3175.02 ml          Physical Exam  Constitutional:       General: He is not in acute distress.     Appearance: Normal appearance. He is normal weight. He is not ill-appearing or diaphoretic.   HENT:      Head: Normocephalic and atraumatic.      Nose: No congestion or rhinorrhea.      Mouth/Throat:      Mouth: Mucous membranes are moist.   Cardiovascular:      Rate and Rhythm: Normal rate and regular rhythm.      Pulses: Normal pulses.      Heart sounds: Normal heart sounds.   Pulmonary:      Effort: Pulmonary effort is normal. No respiratory distress.      Breath sounds: No stridor. No wheezing, rhonchi or rales.      Comments: Improved breath sounds this morning left apex  Chest:      Chest wall: No tenderness.   Abdominal:      General: Abdomen is flat.   Musculoskeletal:      Cervical back: Normal range of motion. No rigidity.      Right lower leg: No edema.      Left lower leg: No edema.   Skin:     General: Skin is warm.      Findings: No erythema.   Neurological:      General: No focal deficit present.      Mental Status: He is alert and oriented to person, place, and time. Mental status is at baseline.   Psychiatric:         Mood and Affect: Mood normal.         Behavior: Behavior normal.         Thought Content: Thought content normal.            Vents:  Oxygen Concentration (%): 28 (12/20/24 0013)  Lines/Drains/Airways       Drain  Duration                  Chest Tube 12/19/24 1750 Left  "<1 day              Peripheral Intravenous Line  Duration                  Peripheral IV - Single Lumen 12/18/24 1741 20 G Anterior;Left Forearm 1 day                  Significant Labs:    CBC/Anemia Profile:  Recent Labs   Lab 12/18/24 1739 12/19/24  0134 12/20/24  0435   WBC 16.74* 16.97* 15.69*   HGB 16.5 15.6 14.8   HCT 50.1 47.2 44.4   * 473* 499*   MCV 91.6 91.3 90.1   RDW 12.9 12.7 12.8        Chemistries:  Recent Labs   Lab 12/18/24  1739 12/19/24  0134 12/20/24  0435   * 133* 135*   K 4.2 4.6 4.7    101 101   CO2 17* 20* 23   BUN 9 10 10   CREATININE 0.79 0.85 0.78   CALCIUM 9.4 9.0 8.4*   ALBUMIN 3.2* 3.0* 2.9*   PROT 7.9* 7.2 6.2   BILITOT 0.4 0.4 0.6   ALKPHOS 75 67 65   ALT 27 30 28   AST 31 28 25   MG  --   --  2.4   PHOS  --   --  4.1       All pertinent labs within the past 24 hours have been reviewed.    Significant Imaging:  I have reviewed all pertinent imaging results/findings within the past 24 hours.    ABG  No results for input(s): "PH", "PO2", "PCO2", "HCO3", "BE" in the last 168 hours.  Assessment/Plan:     Pulmonary  * Pleural effusion  Status post left tube thoracostomy with pleural fluid analysis indicative of exudative effusion.  This is not an empyema, although parapneumonic effusion is on the differential.  The patient does not require mucolytic/thrombolytic therapy through the chest tube as there was no evidence of loculations and follow up chest x-ray shows significant improvement in his pleural effusion.  Continues to be on -40 cc suction on his chest tube.      Pending CT chest to evaluate the left parenchymal space.  Have also requested CT abdomen and pelvis to rule out any underlying lesion, patient's pleural fluid cytology still pending at this time.  Significantly improved in terms of symptoms this morning.      Pending CT chest, may consider removal of chest tube later today or tomorrow morning.    ID  Sepsis due to pneumonia  Continue management with " broad-spectrum antibiotics, follow up blood cultures.  We will send pleural fluid for analysis, including cytology and cultures.    Other  Alcohol dependency  Defer to primary team           Edgar Saleem MD  Critical Care Medicine  [facility] - [address] [place] [address]

## 2024-12-20 NOTE — ASSESSMENT & PLAN NOTE
Pt consumes at least 1 6 pk of Beer daily  Last drink 4 days ago  DT Precautions  CIWA Q shift  Ativan q 2 prn seizures, severe agitation  Thiamine and folate supplementation    CIWA 4  CIWA 0

## 2024-12-20 NOTE — PLAN OF CARE
Problem: Fall Injury Risk  Goal: Absence of Fall and Fall-Related Injury  Outcome: Progressing  Intervention: Identify and Manage Contributors  Flowsheets (Taken 12/19/2024 1844)  Self-Care Promotion:   independence encouraged   adaptive equipment use encouraged   BADL personal objects within reach   BADL personal routines maintained   meal set-up provided  Medication Review/Management: medications reviewed  Intervention: Promote Injury-Free Environment  Flowsheets (Taken 12/19/2024 1844)  Safety Promotion/Fall Prevention:   assistive device/personal item within reach   bed alarm set   Fall Risk reviewed with patient/family   family expresses understanding of fall risk and prevention   instructed to call staff for mobility   lighting adjusted   medications reviewed   muscle strengthening facilitated   nonskid shoes/socks when out of bed   room near unit station

## 2024-12-20 NOTE — PROGRESS NOTES
[facility] Hospital Medicine  Progress Note    Patient Name: Isidoro Martinez  MRN: [id number]  Patient Class: IP- Inpatient   Admission Date: 12/18/2024  Length of Stay: 2 days  Attending Physician: Karl Rascon DO  Primary Care Provider: Gavino Miranda MD        Subjective     Principal Problem:Pleural effusion        HPI:  The patient, a 69-year-old, with a hx of HTN and Daily alcohol consumtion reported feeling unwell for the past three to four days, experiencing shortness of breath and cough productive of sputum with some color. The patient visited Dr. Malone, who performed a chest X-ray and referred the patient to the emergency room.  The patient mentioned a history of feeling funny about 2 weeks ago with sharp chest pains, He was evaluated by his PCP, which prompted an EKG that returned normal. The patient reported no history of similar respiratory issues. The patient did not report fever, chills, chest pain, or leg swelling. Patient endorses daily consumption of 6 pack of beer with last use 4 days ago.    In the ED initial vitals were /82, , T 98.5° F, SpO2 94% on room air.  Initial labs show WBC of 16.74 with a platelet count of 524 remainder of CBC is unremarkable CMP shows a sodium of 133 glucose of 154 otherwise unremarkable. CXR shows complete whiteout of the left hemithorax consistent with large pleural effusion.  Ct chest pending.    Patient will be admitted to hospital medicine service under the direct supervision of Dr ARREAGA for further evaluation and management.   IR drainage of pleural effusion with labs is ordered, however will consult Gen surgery as there is a large fluid collection outside of thorax that may communicate with pleural effusion. CT report pending    Overview/Hospital Course:  12/19 short of breath tachypneic today.  Plan for thoracentesis  12/20 breathing better today    Interval History:  No acute events overnight    Review of  Systems  Objective:     Vital Signs (Most Recent):  Temp: 98.1 °F (36.7 °C) (12/20/24 0729)  Pulse: 88 (12/20/24 0745)  Resp: 16 (12/20/24 0745)  BP: 108/70 (12/20/24 0729)  SpO2: 98 % (12/20/24 0745) Vital Signs (24h Range):  Temp:  [97.8 °F (36.6 °C)-98.7 °F (37.1 °C)] 98.1 °F (36.7 °C)  Pulse:  [] 88  Resp:  [16-29] 16  SpO2:  [95 %-99 %] 98 %  BP: (108-133)/(70-90) 108/70     Weight: 76.2 kg (168 lb)  Body mass index is 25.54 kg/m².    Intake/Output Summary (Last 24 hours) at 12/20/2024 1029  Last data filed at 12/20/2024 1005  Gross per 24 hour   Intake 764.98 ml   Output 3940 ml   Net -3175.02 ml         Physical Exam  Vitals and nursing note reviewed.   Constitutional:       Appearance: Normal appearance. He is ill-appearing.   HENT:      Nose: Nose normal.      Mouth/Throat:      Mouth: Mucous membranes are moist.   Eyes:      Pupils: Pupils are equal, round, and reactive to light.   Cardiovascular:      Rate and Rhythm: Normal rate and regular rhythm.      Heart sounds: Normal heart sounds.   Pulmonary:      Comments: Chest tube in place  Abdominal:      Palpations: Abdomen is soft.   Musculoskeletal:         General: Normal range of motion.      Cervical back: Neck supple.   Skin:     General: Skin is warm and dry.   Neurological:      Mental Status: He is alert and oriented to person, place, and time.             Significant Labs: All pertinent labs within the past 24 hours have been reviewed.    Significant Imaging: I have reviewed all pertinent imaging results/findings within the past 24 hours.    Assessment and Plan     * Pleural effusion    On CT of chest with contrast, patient was found to have a large left pleural effusion with complete collapse of the left lung and a 2 cm ground-glass opacification within the right lower lobe. A thoracentesis was ordered. Most likely etiology includes  parapneumonic effusion vs pneumonia vs malignancy . Will consult IR for drainage.     Pleural fluid  labs/micro ordered  Empiric coverage with Vanc and Zosyn  Consult IR for thoracentesis    Thoracentesis today  Marked improvement in aeration of the left lung.  Chest x-ray reviewed and independently interpreted.    Acidosis  Resolved      Tobacco abuse        Sepsis due to pneumonia    This patient does have evidence of infective focus  My overall impression is sepsis.  Source: Respiratory  Antibiotics given-   Antibiotics (72h ago, onward)      Start     Stop Route Frequency Ordered    12/18/24 2200  piperacillin-tazobactam (ZOSYN) 4.5 g in D5W 100 mL IVPB (MB+)         -- IV Every 8 hours (non-standard times) 12/18/24 2049            Fluid challenge Not needed - patient is not hypotensive      Post- resuscitation assessment No - Post resuscitation assessment not needed       Will Not start Pressors- Levophed for MAP of 65  Source control achieved by: Empiric ABX  Continue antibiotics.  Discussed case with Dr. Saleem believes this may be malignancy.  CT chest abdomen pelvis    Alcohol dependency  Pt consumes at least 1 6 pk of Beer daily  Last drink 4 days ago  DT Precautions  CIWA Q shift  Ativan q 2 prn seizures, severe agitation  Thiamine and folate supplementation    CIWA 4  CIWA 0    Hypertension    Patient's blood pressure range in the last 24 hours was: BP  Min: 108/70  Max: 133/90.The patient's inpatient anti-hypertensive regimen is listed below:    Current Antihypertensives    amLODIPine tablet 10 mg, Daily, Oral    Plan    - BP is controlled, no changes needed to their regimen  - follow  BP okay      VTE Risk Mitigation (From admission, onward)           Ordered     Reason for No Pharmacological VTE Prophylaxis  Once        Question:  Reasons:  Answer:  Physician Provided (leave comment)  Comment:  Procedure in AM (thoracentesis)    12/18/24 1948     Place SIS hose  Until discontinued         12/18/24 1948     IP VTE LOW RISK PATIENT  Once         12/18/24 1948                    Discharge Planning   RAHAT:       Code Status: Full Code   Medical Readiness for Discharge Date:   Discharge Plan A: Home with family          Labs and consultant notes reviewed.  Discussed case with Dr. Saleem.  Chest x-ray reviewed and independently interpreted.  Marked improvement in aeration of the left lung field.              Karl Rascon DO  Department of Hospital Medicine   [facility] - [address] [place] [address]

## 2024-12-20 NOTE — PLAN OF CARE
Problem: Adult Inpatient Plan of Care  Goal: Plan of Care Review  Outcome: Progressing  Goal: Patient-Specific Goal (Individualized)  Outcome: Progressing  Goal: Absence of Hospital-Acquired Illness or Injury  Outcome: Progressing  Goal: Optimal Comfort and Wellbeing  Outcome: Progressing  Goal: Readiness for Transition of Care  Outcome: Progressing     Problem: Violence Risk or Actual  Goal: Anger and Impulse Control  Outcome: Progressing     Problem: Sepsis/Septic Shock  Goal: Optimal Coping  Outcome: Progressing  Goal: Absence of Bleeding  Outcome: Progressing  Goal: Blood Glucose Level Within Targeted Range  Outcome: Progressing  Goal: Absence of Infection Signs and Symptoms  Outcome: Progressing  Goal: Optimal Nutrition Intake  Outcome: Progressing     Problem: Pneumonia  Goal: Fluid Balance  Outcome: Progressing  Goal: Resolution of Infection Signs and Symptoms  Outcome: Progressing  Goal: Effective Oxygenation and Ventilation  Outcome: Progressing     Problem: Fall Injury Risk  Goal: Absence of Fall and Fall-Related Injury  Outcome: Progressing     Problem: Breathing Pattern Ineffective  Goal: Effective Breathing Pattern  Outcome: Progressing     Problem: Gas Exchange Impaired  Goal: Optimal Gas Exchange  Outcome: Progressing

## 2024-12-20 NOTE — SUBJECTIVE & OBJECTIVE
Interval History:  No acute events overnight    Review of Systems  Objective:     Vital Signs (Most Recent):  Temp: 98.1 °F (36.7 °C) (12/20/24 0729)  Pulse: 88 (12/20/24 0745)  Resp: 16 (12/20/24 0745)  BP: 108/70 (12/20/24 0729)  SpO2: 98 % (12/20/24 0745) Vital Signs (24h Range):  Temp:  [97.8 °F (36.6 °C)-98.7 °F (37.1 °C)] 98.1 °F (36.7 °C)  Pulse:  [] 88  Resp:  [16-29] 16  SpO2:  [95 %-99 %] 98 %  BP: (108-133)/(70-90) 108/70     Weight: 76.2 kg (168 lb)  Body mass index is 25.54 kg/m².    Intake/Output Summary (Last 24 hours) at 12/20/2024 1029  Last data filed at 12/20/2024 1005  Gross per 24 hour   Intake 764.98 ml   Output 3940 ml   Net -3175.02 ml         Physical Exam  Vitals and nursing note reviewed.   Constitutional:       Appearance: Normal appearance. He is ill-appearing.   HENT:      Nose: Nose normal.      Mouth/Throat:      Mouth: Mucous membranes are moist.   Eyes:      Pupils: Pupils are equal, round, and reactive to light.   Cardiovascular:      Rate and Rhythm: Normal rate and regular rhythm.      Heart sounds: Normal heart sounds.   Pulmonary:      Comments: Chest tube in place  Abdominal:      Palpations: Abdomen is soft.   Musculoskeletal:         General: Normal range of motion.      Cervical back: Neck supple.   Skin:     General: Skin is warm and dry.   Neurological:      Mental Status: He is alert and oriented to person, place, and time.             Significant Labs: All pertinent labs within the past 24 hours have been reviewed.    Significant Imaging: I have reviewed all pertinent imaging results/findings within the past 24 hours.

## 2024-12-21 DIAGNOSIS — C34.90 MALIGNANT NEOPLASM OF UNSPECIFIED PART OF UNSPECIFIED BRONCHUS OR LUNG: ICD-10-CM

## 2024-12-21 DIAGNOSIS — R91.1 SOLITARY PULMONARY NODULE: Primary | ICD-10-CM

## 2024-12-21 PROBLEM — Z46.82 ENCOUNTER FOR CHEST TUBE REMOVAL: Status: RESOLVED | Noted: 2024-12-21 | Resolved: 2024-12-21

## 2024-12-21 PROBLEM — Z46.82 ENCOUNTER FOR CHEST TUBE REMOVAL: Status: ACTIVE | Noted: 2024-12-21

## 2024-12-21 LAB
ALBUMIN SERPL BCP-MCNC: 2.6 G/DL (ref 3.4–4.8)
ALBUMIN/GLOB SERPL: 0.7 {RATIO}
ALP SERPL-CCNC: 54 U/L (ref 40–150)
ALT SERPL W P-5'-P-CCNC: 22 U/L
ANION GAP SERPL CALCULATED.3IONS-SCNC: 13 MMOL/L (ref 7–16)
AST SERPL W P-5'-P-CCNC: 30 U/L (ref 5–34)
BASOPHILS # BLD AUTO: 0.04 K/UL (ref 0–0.2)
BASOPHILS NFR BLD AUTO: 0.4 % (ref 0–1)
BILIRUB SERPL-MCNC: 0.5 MG/DL
BUN SERPL-MCNC: 7 MG/DL (ref 8–26)
BUN/CREAT SERPL: 10 (ref 6–20)
CALCIUM SERPL-MCNC: 8.5 MG/DL (ref 8.8–10)
CHLORIDE SERPL-SCNC: 100 MMOL/L (ref 98–107)
CO2 SERPL-SCNC: 23 MMOL/L (ref 23–31)
CREAT SERPL-MCNC: 0.71 MG/DL (ref 0.72–1.25)
DIFFERENTIAL METHOD BLD: ABNORMAL
EGFR (NO RACE VARIABLE) (RUSH/TITUS): 100 ML/MIN/1.73M2
EOSINOPHIL # BLD AUTO: 0.23 K/UL (ref 0–0.5)
EOSINOPHIL NFR BLD AUTO: 2.3 % (ref 1–4)
ERYTHROCYTE [DISTWIDTH] IN BLOOD BY AUTOMATED COUNT: 12.8 % (ref 11.5–14.5)
GLOBULIN SER-MCNC: 3.8 G/DL (ref 2–4)
GLUCOSE SERPL-MCNC: 112 MG/DL (ref 82–115)
HCT VFR BLD AUTO: 40.9 % (ref 40–54)
HGB BLD-MCNC: 13.8 G/DL (ref 13.5–18)
IMM GRANULOCYTES # BLD AUTO: 0.11 K/UL (ref 0–0.04)
IMM GRANULOCYTES NFR BLD: 1.1 % (ref 0–0.4)
LYMPHOCYTES # BLD AUTO: 1.64 K/UL (ref 1–4.8)
LYMPHOCYTES NFR BLD AUTO: 16.5 % (ref 27–41)
MCH RBC QN AUTO: 30.3 PG (ref 27–31)
MCHC RBC AUTO-ENTMCNC: 33.7 G/DL (ref 32–36)
MCV RBC AUTO: 89.7 FL (ref 80–96)
METHICILLIN RESISTANT STAPHYLOCOCCUS AUREUS: NEGATIVE
MONOCYTES # BLD AUTO: 1.47 K/UL (ref 0–0.8)
MONOCYTES NFR BLD AUTO: 14.8 % (ref 2–6)
MPC BLD CALC-MCNC: 9.3 FL (ref 9.4–12.4)
NEUTROPHILS # BLD AUTO: 6.45 K/UL (ref 1.8–7.7)
NEUTROPHILS NFR BLD AUTO: 64.9 % (ref 53–65)
NRBC # BLD AUTO: 0 X10E3/UL
NRBC, AUTO (.00): 0 %
PLATELET # BLD AUTO: 433 K/UL (ref 150–400)
POTASSIUM SERPL-SCNC: 3.6 MMOL/L (ref 3.5–5.1)
PROT SERPL-MCNC: 6.4 G/DL (ref 5.8–7.6)
RBC # BLD AUTO: 4.56 M/UL (ref 4.6–6.2)
SODIUM SERPL-SCNC: 132 MMOL/L (ref 136–145)
VANCOMYCIN TROUGH SERPL-MCNC: <1.4 ΜG/ML (ref 15–20)
WBC # BLD AUTO: 9.94 K/UL (ref 4.5–11)

## 2024-12-21 PROCEDURE — 25000003 PHARM REV CODE 250: Performed by: INTERNAL MEDICINE

## 2024-12-21 PROCEDURE — 80053 COMPREHEN METABOLIC PANEL: CPT | Performed by: NURSE PRACTITIONER

## 2024-12-21 PROCEDURE — 99233 SBSQ HOSP IP/OBS HIGH 50: CPT | Mod: ,,, | Performed by: STUDENT IN AN ORGANIZED HEALTH CARE EDUCATION/TRAINING PROGRAM

## 2024-12-21 PROCEDURE — 25000003 PHARM REV CODE 250: Performed by: NURSE PRACTITIONER

## 2024-12-21 PROCEDURE — 36415 COLL VENOUS BLD VENIPUNCTURE: CPT | Performed by: NURSE PRACTITIONER

## 2024-12-21 PROCEDURE — 99233 SBSQ HOSP IP/OBS HIGH 50: CPT | Mod: ,,, | Performed by: FAMILY MEDICINE

## 2024-12-21 PROCEDURE — 25000242 PHARM REV CODE 250 ALT 637 W/ HCPCS: Performed by: NURSE PRACTITIONER

## 2024-12-21 PROCEDURE — 11000001 HC ACUTE MED/SURG PRIVATE ROOM

## 2024-12-21 PROCEDURE — 85025 COMPLETE CBC W/AUTO DIFF WBC: CPT | Performed by: NURSE PRACTITIONER

## 2024-12-21 PROCEDURE — 27000221 HC OXYGEN, UP TO 24 HOURS

## 2024-12-21 PROCEDURE — 63600175 PHARM REV CODE 636 W HCPCS: Performed by: NURSE PRACTITIONER

## 2024-12-21 PROCEDURE — 80202 ASSAY OF VANCOMYCIN: CPT | Performed by: INTERNAL MEDICINE

## 2024-12-21 PROCEDURE — 63600175 PHARM REV CODE 636 W HCPCS: Performed by: INTERNAL MEDICINE

## 2024-12-21 PROCEDURE — 94761 N-INVAS EAR/PLS OXIMETRY MLT: CPT

## 2024-12-21 PROCEDURE — 99900035 HC TECH TIME PER 15 MIN (STAT)

## 2024-12-21 PROCEDURE — 94640 AIRWAY INHALATION TREATMENT: CPT

## 2024-12-21 RX ORDER — AMLODIPINE BESYLATE 5 MG/1
5 TABLET ORAL DAILY
Status: DISCONTINUED | OUTPATIENT
Start: 2024-12-22 | End: 2024-12-23 | Stop reason: HOSPADM

## 2024-12-21 RX ADMIN — FOLIC ACID 1 MG: 1 TABLET ORAL at 08:12

## 2024-12-21 RX ADMIN — PIPERACILLIN SODIUM AND TAZOBACTAM SODIUM 4.5 G: 4; .5 INJECTION, POWDER, LYOPHILIZED, FOR SOLUTION INTRAVENOUS at 09:12

## 2024-12-21 RX ADMIN — IPRATROPIUM BROMIDE AND ALBUTEROL SULFATE 3 ML: .5; 3 SOLUTION RESPIRATORY (INHALATION) at 01:12

## 2024-12-21 RX ADMIN — AMLODIPINE BESYLATE 10 MG: 10 TABLET ORAL at 08:12

## 2024-12-21 RX ADMIN — PIPERACILLIN SODIUM AND TAZOBACTAM SODIUM 4.5 G: 4; .5 INJECTION, POWDER, LYOPHILIZED, FOR SOLUTION INTRAVENOUS at 01:12

## 2024-12-21 RX ADMIN — IPRATROPIUM BROMIDE AND ALBUTEROL SULFATE 3 ML: .5; 3 SOLUTION RESPIRATORY (INHALATION) at 07:12

## 2024-12-21 RX ADMIN — PIPERACILLIN SODIUM AND TAZOBACTAM SODIUM 4.5 G: 4; .5 INJECTION, POWDER, LYOPHILIZED, FOR SOLUTION INTRAVENOUS at 05:12

## 2024-12-21 NOTE — ASSESSMENT & PLAN NOTE
Status post left tube thoracostomy with pleural fluid analysis indicative of exudative effusion.  This is not an empyema, although parapneumonic effusion is on the differential.  Significant improvement in re-expansion mainly left upper lung.  Status post removal of chest tube at bedside today.  PET-CT scan plan as below.      Parapneumonic effusion versus malignancy remain on the differential with cytology pending at this time.  Plan to repeat scan with a PET-CT scan to look for any areas of FDG avidity and we will plan for outpatient bronchoscopy.  There is a risk that the left-sided pleural effusion returns (especially if malignant) which time we will consider repeat thoracentesis versus indwelling PleurX placement (patient aware).

## 2024-12-21 NOTE — ASSESSMENT & PLAN NOTE
Pt consumes at least 1 6 pk of Beer daily  Last drink 4 days ago  DT Precautions  CIWA Q shift  Ativan q 2 prn seizures, severe agitation  Thiamine and folate supplementation    CIWA 4  CIWA 0  CIWA 0 - monitor.

## 2024-12-21 NOTE — SUBJECTIVE & OBJECTIVE
Interval History/Significant Events:  Refer to hospital course    Review of Systems  Objective:     Vital Signs (Most Recent):  Temp: 98 °F (36.7 °C) (12/21/24 1107)  Pulse: 78 (12/21/24 1107)  Resp: 18 (12/21/24 1107)  BP: 104/67 (12/21/24 1107)  SpO2: 95 % (12/21/24 1107) Vital Signs (24h Range):  Temp:  [98 °F (36.7 °C)-98.3 °F (36.8 °C)] 98 °F (36.7 °C)  Pulse:  [] 78  Resp:  [17-20] 18  SpO2:  [93 %-98 %] 95 %  BP: (104-131)/(67-79) 104/67   Weight: 76.2 kg (168 lb)  Body mass index is 25.54 kg/m².      Intake/Output Summary (Last 24 hours) at 12/21/2024 1156  Last data filed at 12/21/2024 0515  Gross per 24 hour   Intake --   Output 940 ml   Net -940 ml          Physical Exam  Constitutional:       General: He is not in acute distress.     Appearance: Normal appearance. He is normal weight. He is not ill-appearing or diaphoretic.   HENT:      Head: Normocephalic and atraumatic.      Nose: No congestion or rhinorrhea.      Mouth/Throat:      Mouth: Mucous membranes are moist.   Cardiovascular:      Rate and Rhythm: Normal rate and regular rhythm.      Pulses: Normal pulses.      Heart sounds: Normal heart sounds.   Pulmonary:      Effort: Pulmonary effort is normal. No respiratory distress.      Breath sounds: No stridor. No wheezing, rhonchi or rales.      Comments: Improved breath sounds left lung, scattered rhonchi.  No wheezing.  No evidence of subcutaneous emphysema.  No oozing/purulence around chest tube site.  Chest:      Chest wall: No tenderness.   Abdominal:      General: Abdomen is flat.   Musculoskeletal:      Cervical back: Normal range of motion. No rigidity.      Right lower leg: No edema.      Left lower leg: No edema.   Skin:     General: Skin is warm.      Findings: No erythema.   Neurological:      General: No focal deficit present.      Mental Status: He is alert and oriented to person, place, and time. Mental status is at baseline.   Psychiatric:         Mood and Affect: Mood normal.          Behavior: Behavior normal.         Thought Content: Thought content normal.            Vents:  Oxygen Concentration (%): 28 (12/20/24 0013)  Lines/Drains/Airways       Drain  Duration                  Chest Tube 12/19/24 1750 Left 1 day              Peripheral Intravenous Line  Duration                  Peripheral IV - Single Lumen 12/18/24 1741 20 G Anterior;Left Forearm 2 days                  Significant Labs:    CBC/Anemia Profile:  Recent Labs   Lab 12/20/24  0435 12/21/24  0546   WBC 15.69* 9.94   HGB 14.8 13.8   HCT 44.4 40.9   * 433*   MCV 90.1 89.7   RDW 12.8 12.8        Chemistries:  Recent Labs   Lab 12/20/24  0435 12/21/24  0546   * 132*   K 4.7 3.6    100   CO2 23 23   BUN 10 7*   CREATININE 0.78 0.71*   CALCIUM 8.4* 8.5*   ALBUMIN 2.9* 2.6*   PROT 6.2 6.4   BILITOT 0.6 0.5   ALKPHOS 65 54   ALT 28 22   AST 25 30   MG 2.4  --    PHOS 4.1  --        All pertinent labs within the past 24 hours have been reviewed.    Significant Imaging:  I have reviewed all pertinent imaging results/findings within the past 24 hours.

## 2024-12-21 NOTE — ASSESSMENT & PLAN NOTE
This patient does have evidence of infective focus  My overall impression is sepsis.  Source: Respiratory  Antibiotics given-   Antibiotics (72h ago, onward)      Start     Stop Route Frequency Ordered    12/18/24 2200  piperacillin-tazobactam (ZOSYN) 4.5 g in D5W 100 mL IVPB (MB+)         -- IV Every 8 hours (non-standard times) 12/18/24 2049            Fluid challenge Not needed - patient is not hypotensive      Post- resuscitation assessment No - Post resuscitation assessment not needed       Will Not start Pressors- Levophed for MAP of 65  Source control achieved by: Empiric ABX  Continue antibiotics.  Discussed case with Dr. Saleem believes this may be malignancy.  CT chest abdomen pelvis  12/21 - Complete course of abx.  Likely home Monday with pulmonary followup ( for malignancy eval)

## 2024-12-21 NOTE — ASSESSMENT & PLAN NOTE
On CT of chest with contrast, patient was found to have a large left pleural effusion with complete collapse of the left lung and a 2 cm ground-glass opacification within the right lower lobe. A thoracentesis was ordered. Most likely etiology includes  parapneumonic effusion vs pneumonia vs malignancy . Will consult IR for drainage.     Pleural fluid labs/micro ordered  Empiric coverage with Vanc and Zosyn  Consult IR for thoracentesis    Thoracentesis today  Marked improvement in aeration of the left lung.  Chest x-ray reviewed and independently interpreted.    12/21 - CT removed after my visit. D/w Dr. Saleem.

## 2024-12-21 NOTE — ASSESSMENT & PLAN NOTE
Chest tube (pigtail) Removal Procedure Note    Indications:  Resolution of pleural effusion this morning        DESCRIPTION OF PROCEDURE:  Gauze dressing was removed from the chest tube site which was inspected without evidence of oozing, bleeding or subcutaneous emphysema.    Using a suture removal kit, the sutures anchoring the pigtail catheter were cut.  The chest tube was then removed smoothly, ensuring that there was no resistance and the pigtail catheter was intact.      The site was then covered with Vaseline gauze and one Tegaderm.      No bleeding, oozing noted status post removal.      The patient is pending a repeat chest x-ray.

## 2024-12-21 NOTE — PROGRESS NOTES
[facility]  Critical Care Medicine  Progress Note    Patient Name: Isidoro Martinez  MRN: [id number]  Admission Date: 12/18/2024  Hospital Length of Stay: 3 days  Code Status: Full Code  Attending Provider: Joao Michel Jr., MD  Primary Care Provider: Gavino Miranda MD   Principal Problem: Pleural effusion    Subjective:     HPI:  This is a 69-year-old gentleman with past medical history significant for hypertension who presented to the hospital on 12/1924 in the setting of shortness of breath, cough which was productive.      I personally reviewed the patient's imaging (chest x-ray which showed complete opacification of left hemithorax.  There were some evidence of air bronchograms.  He also had a CT chest with contrast performed in the evening of 12/18/24 which showed evidence of a large left-sided pleural effusion with the associated compressive atelectasis along with mediastinal shift towards the contralateral side.  When I saw the patient at bedside today, he was saturating at 96% on 2 L nasal cannula.  He had some dyspnea was was still able to speak in complete sentences.  He denies any significant weight loss or hemoptysis.    Hospital/ICU Course:  12/20/24   The patient had 2100 cc of immediate drainage after chest tube placement yesterday.  Overnight, the chest tube was unclamped again at around midnight with a total drainage of 800 cc this morning.  I attempted additional flushing and drainage at bedside today with no significant amount of fluid.  I reviewed the patient's chest x-ray from this morning which showed near-complete resolution of left-sided pleural effusion with evidence of basilar atelectasis present in the left lower lobe.  Pleural fluid analysis consistent with exudative effusion.  Cytology pending.    12/21/24:  I reviewed the patient's chest imaging which showed re-expansion of his left lung.  There were suspicious appearing opacification left upper  lobe.  He also has ongoing consolidation left lower lobe with stenosis takeoff of left lower lobe concerning for endobronchial lesion versus infection.  Cytology still pending.  Chest tube removed at bedside today.      Interval History/Significant Events:  Refer to hospital course    Review of Systems  Objective:     Vital Signs (Most Recent):  Temp: 98 °F (36.7 °C) (12/21/24 1107)  Pulse: 78 (12/21/24 1107)  Resp: 18 (12/21/24 1107)  BP: 104/67 (12/21/24 1107)  SpO2: 95 % (12/21/24 1107) Vital Signs (24h Range):  Temp:  [98 °F (36.7 °C)-98.3 °F (36.8 °C)] 98 °F (36.7 °C)  Pulse:  [] 78  Resp:  [17-20] 18  SpO2:  [93 %-98 %] 95 %  BP: (104-131)/(67-79) 104/67   Weight: 76.2 kg (168 lb)  Body mass index is 25.54 kg/m².      Intake/Output Summary (Last 24 hours) at 12/21/2024 1156  Last data filed at 12/21/2024 0515  Gross per 24 hour   Intake --   Output 940 ml   Net -940 ml          Physical Exam  Constitutional:       General: He is not in acute distress.     Appearance: Normal appearance. He is normal weight. He is not ill-appearing or diaphoretic.   HENT:      Head: Normocephalic and atraumatic.      Nose: No congestion or rhinorrhea.      Mouth/Throat:      Mouth: Mucous membranes are moist.   Cardiovascular:      Rate and Rhythm: Normal rate and regular rhythm.      Pulses: Normal pulses.      Heart sounds: Normal heart sounds.   Pulmonary:      Effort: Pulmonary effort is normal. No respiratory distress.      Breath sounds: No stridor. No wheezing, rhonchi or rales.      Comments: Improved breath sounds left lung, scattered rhonchi.  No wheezing.  No evidence of subcutaneous emphysema.  No oozing/purulence around chest tube site.  Chest:      Chest wall: No tenderness.   Abdominal:      General: Abdomen is flat.   Musculoskeletal:      Cervical back: Normal range of motion. No rigidity.      Right lower leg: No edema.      Left lower leg: No edema.   Skin:     General: Skin is warm.      Findings: No  "erythema.   Neurological:      General: No focal deficit present.      Mental Status: He is alert and oriented to person, place, and time. Mental status is at baseline.   Psychiatric:         Mood and Affect: Mood normal.         Behavior: Behavior normal.         Thought Content: Thought content normal.            Vents:  Oxygen Concentration (%): 28 (12/20/24 0013)  Lines/Drains/Airways       Drain  Duration                  Chest Tube 12/19/24 1750 Left 1 day              Peripheral Intravenous Line  Duration                  Peripheral IV - Single Lumen 12/18/24 1741 20 G Anterior;Left Forearm 2 days                  Significant Labs:    CBC/Anemia Profile:  Recent Labs   Lab 12/20/24  0435 12/21/24  0546   WBC 15.69* 9.94   HGB 14.8 13.8   HCT 44.4 40.9   * 433*   MCV 90.1 89.7   RDW 12.8 12.8        Chemistries:  Recent Labs   Lab 12/20/24  0435 12/21/24  0546   * 132*   K 4.7 3.6    100   CO2 23 23   BUN 10 7*   CREATININE 0.78 0.71*   CALCIUM 8.4* 8.5*   ALBUMIN 2.9* 2.6*   PROT 6.2 6.4   BILITOT 0.6 0.5   ALKPHOS 65 54   ALT 28 22   AST 25 30   MG 2.4  --    PHOS 4.1  --        All pertinent labs within the past 24 hours have been reviewed.    Significant Imaging:  I have reviewed all pertinent imaging results/findings within the past 24 hours.    ABG  No results for input(s): "PH", "PO2", "PCO2", "HCO3", "BE" in the last 168 hours.  Assessment/Plan:     Pulmonary  * Pleural effusion  Status post left tube thoracostomy with pleural fluid analysis indicative of exudative effusion.  This is not an empyema, although parapneumonic effusion is on the differential.  Significant improvement in re-expansion mainly left upper lung.  Status post removal of chest tube at bedside today.  PET-CT scan plan as below.      Parapneumonic effusion versus malignancy remain on the differential with cytology pending at this time.  Plan to repeat scan with a PET-CT scan to look for any areas of FDG avidity and " we will plan for outpatient bronchoscopy.  There is a risk that the left-sided pleural effusion returns (especially if malignant) which time we will consider repeat thoracentesis versus indwelling PleurX placement (patient aware).        Nodule of upper lobe of left lung  There is an opacification in the left upper lobe, consistent with lung nodule versus rounded atelectasis/localized fluid collection.  In the context of his unilateral pleural effusion, possible endobronchial lesion in the left lower lobe bronchus (versus mucus plugging), malignancy is on the differential.  This needs further evaluation with additional/interval imaging (PET-CT scan will allow us to perform interval imaging as well as look for any FDG avid areas).  After that, we will determine with the patient requires navigational bronchoscopy versus flexible/rigid bronchoscopy alone.  Cytology is also pending at this time which will help make that determination.  Negative cytology is not very helpful due to the diagnostic yield of pleural fluid malignancy, however if it is positive it will help determine next steps.      I have discussed the above with the patient.  We will schedule him for a PET-CT scan as soon as possible as an outpatient and schedule him for an outpatient bronchoscopy pending his PET-CT scan.    ID  Sepsis due to pneumonia  Continue management with broad-spectrum antibiotics, follow up blood cultures.  We will send pleural fluid for analysis, including cytology and cultures.    Other  Encounter for chest tube removal  Chest tube (pigtail) Removal Procedure Note    Indications:  Resolution of pleural effusion this morning        DESCRIPTION OF PROCEDURE:  Gauze dressing was removed from the chest tube site which was inspected without evidence of oozing, bleeding or subcutaneous emphysema.    Using a suture removal kit, the sutures anchoring the pigtail catheter were cut.  The chest tube was then removed smoothly, ensuring that  there was no resistance and the pigtail catheter was intact.      The site was then covered with Vaseline gauze and one Tegaderm.      No bleeding, oozing noted status post removal.      The patient is pending a repeat chest x-ray.          Alcohol dependency  Defer to primary team      I will sign off at this time and plan for outpatient follow up.  Please feel free to reach out if you have any additional questions.  Primary team updated at bedside.          Edgar Saleem MD  Critical Care Medicine  [facility] - [address] [place] [address]

## 2024-12-21 NOTE — PLAN OF CARE
Problem: Adult Inpatient Plan of Care  Goal: Plan of Care Review  Outcome: Progressing  Goal: Patient-Specific Goal (Individualized)  Outcome: Progressing  Goal: Absence of Hospital-Acquired Illness or Injury  Outcome: Progressing  Goal: Optimal Comfort and Wellbeing  Outcome: Progressing  Goal: Readiness for Transition of Care  Outcome: Progressing     Problem: Violence Risk or Actual  Goal: Anger and Impulse Control  Outcome: Progressing  Intervention: Promote Self-Control  Flowsheets (Taken 12/21/2024 0408)  Supportive Measures:   active listening utilized   decision-making supported   positive reinforcement provided     Problem: Sepsis/Septic Shock  Goal: Optimal Coping  Outcome: Progressing  Goal: Absence of Bleeding  Outcome: Progressing  Goal: Blood Glucose Level Within Targeted Range  Outcome: Progressing  Goal: Absence of Infection Signs and Symptoms  Outcome: Progressing  Goal: Optimal Nutrition Intake  Outcome: Progressing     Problem: Pneumonia  Goal: Fluid Balance  Outcome: Progressing  Intervention: Monitor and Manage Fluid Balance  Flowsheets (Taken 12/21/2024 0408)  Fluid/Electrolyte Management: fluids provided  Goal: Resolution of Infection Signs and Symptoms  Outcome: Progressing  Intervention: Prevent Infection Progression  Flowsheets (Taken 12/21/2024 0408)  Infection Management: aseptic technique maintained  Isolation Precautions: precautions initiated  Goal: Effective Oxygenation and Ventilation  Outcome: Progressing  Intervention: Promote Airway Secretion Clearance  Flowsheets (Taken 12/21/2024 0408)  Breathing Techniques/Airway Clearance: deep/controlled cough encouraged  Cough And Deep Breathing: done independently per patient  Intervention: Optimize Oxygenation and Ventilation  Flowsheets (Taken 12/21/2024 0408)  Airway/Ventilation Management: airway patency maintained     Problem: Fall Injury Risk  Goal: Absence of Fall and Fall-Related Injury  Outcome: Progressing     Problem: Breathing  Pattern Ineffective  Goal: Effective Breathing Pattern  Outcome: Progressing     Problem: Gas Exchange Impaired  Goal: Optimal Gas Exchange  Outcome: Progressing

## 2024-12-21 NOTE — PROGRESS NOTES
[facility] Hospital Medicine  Progress Note    Patient Name: Isidoro Martinez  MRN: [id number]  Patient Class: IP- Inpatient   Admission Date: 12/18/2024  Length of Stay: 3 days  Attending Physician: Joao Michel Jr., MD  Primary Care Provider: Gavino Miranda MD        Subjective     Principal Problem:Pleural effusion        HPI:  The patient, a 69-year-old, with a hx of HTN and Daily alcohol consumtion reported feeling unwell for the past three to four days, experiencing shortness of breath and cough productive of sputum with some color. The patient visited Dr. Malone, who performed a chest X-ray and referred the patient to the emergency room.  The patient mentioned a history of feeling funny about 2 weeks ago with sharp chest pains, He was evaluated by his PCP, which prompted an EKG that returned normal. The patient reported no history of similar respiratory issues. The patient did not report fever, chills, chest pain, or leg swelling. Patient endorses daily consumption of 6 pack of beer with last use 4 days ago.    In the ED initial vitals were /82, , T 98.5° F, SpO2 94% on room air.  Initial labs show WBC of 16.74 with a platelet count of 524 remainder of CBC is unremarkable CMP shows a sodium of 133 glucose of 154 otherwise unremarkable. CXR shows complete whiteout of the left hemithorax consistent with large pleural effusion.  Ct chest pending.    Patient will be admitted to hospital medicine service under the direct supervision of Dr ARREAGA for further evaluation and management.   IR drainage of pleural effusion with labs is ordered, however will consult Gen surgery as there is a large fluid collection outside of thorax that may communicate with pleural effusion. CT report pending    Overview/Hospital Course:  12/19 short of breath tachypneic today.  Plan for thoracentesis  12/20 breathing better today    Interval History: No acute complaints. Dr. Saleem to  remove chest tube.     Review of Systems  Objective:     Vital Signs (Most Recent):  Temp: 98 °F (36.7 °C) (12/21/24 1107)  Pulse: 98 (12/21/24 1331)  Resp: 16 (12/21/24 1331)  BP: 104/67 (12/21/24 1107)  SpO2: 96 % (12/21/24 1331) Vital Signs (24h Range):  Temp:  [98 °F (36.7 °C)-98.3 °F (36.8 °C)] 98 °F (36.7 °C)  Pulse:  [] 98  Resp:  [16-20] 16  SpO2:  [93 %-98 %] 96 %  BP: (104-131)/(67-79) 104/67     Weight: 76.2 kg (168 lb)  Body mass index is 25.54 kg/m².    Intake/Output Summary (Last 24 hours) at 12/21/2024 1341  Last data filed at 12/21/2024 0515  Gross per 24 hour   Intake --   Output 940 ml   Net -940 ml         Physical Exam  Vitals and nursing note reviewed.   Constitutional:       Appearance: Normal appearance. He is not ill-appearing.   HENT:      Nose: Nose normal.      Mouth/Throat:      Mouth: Mucous membranes are moist.   Eyes:      Pupils: Pupils are equal, round, and reactive to light.   Cardiovascular:      Rate and Rhythm: Normal rate and regular rhythm.      Heart sounds: Normal heart sounds.   Pulmonary:      Effort: Pulmonary effort is normal.      Breath sounds: Normal breath sounds.      Comments: Chest tube in place  Abdominal:      Palpations: Abdomen is soft.   Musculoskeletal:         General: Normal range of motion.      Cervical back: Neck supple.   Skin:     General: Skin is warm and dry.   Neurological:      Mental Status: He is alert and oriented to person, place, and time.             Significant Labs: All pertinent labs within the past 24 hours have been reviewed.  BMP:   Recent Labs   Lab 12/20/24  0435 12/21/24  0546   * 112   * 132*   K 4.7 3.6    100   CO2 23 23   BUN 10 7*   CREATININE 0.78 0.71*   CALCIUM 8.4* 8.5*   MG 2.4  --      CBC:   Recent Labs   Lab 12/20/24  0435 12/21/24  0546   WBC 15.69* 9.94   HGB 14.8 13.8   HCT 44.4 40.9   * 433*       Significant Imaging: I have reviewed all pertinent imaging results/findings within the  past 24 hours.    Assessment and Plan     * Pleural effusion    On CT of chest with contrast, patient was found to have a large left pleural effusion with complete collapse of the left lung and a 2 cm ground-glass opacification within the right lower lobe. A thoracentesis was ordered. Most likely etiology includes  parapneumonic effusion vs pneumonia vs malignancy . Will consult IR for drainage.     Pleural fluid labs/micro ordered  Empiric coverage with Vanc and Zosyn  Consult IR for thoracentesis    Thoracentesis today  Marked improvement in aeration of the left lung.  Chest x-ray reviewed and independently interpreted.    12/21 - CT removed after my visit. D/w Dr. Saleem.     Nodule of upper lobe of left lung        Acidosis  Resolved      Tobacco abuse        Sepsis due to pneumonia    This patient does have evidence of infective focus  My overall impression is sepsis.  Source: Respiratory  Antibiotics given-   Antibiotics (72h ago, onward)      Start     Stop Route Frequency Ordered    12/18/24 2200  piperacillin-tazobactam (ZOSYN) 4.5 g in D5W 100 mL IVPB (MB+)         -- IV Every 8 hours (non-standard times) 12/18/24 2049            Fluid challenge Not needed - patient is not hypotensive      Post- resuscitation assessment No - Post resuscitation assessment not needed       Will Not start Pressors- Levophed for MAP of 65  Source control achieved by: Empiric ABX  Continue antibiotics.  Discussed case with Dr. Saleem believes this may be malignancy.  CT chest abdomen pelvis  12/21 - Complete course of abx.  Likely home Monday with pulmonary followup ( for malignancy eval)    Alcohol dependency  Pt consumes at least 1 6 pk of Beer daily  Last drink 4 days ago  DT Precautions  CIWA Q shift  Ativan q 2 prn seizures, severe agitation  Thiamine and folate supplementation    CIWA 4  CIWA 0  CIWA 0 - monitor.     Hypertension    Patient's blood pressure range in the last 24 hours was: BP  Min: 104/67  Max: 131/79.The  patient's inpatient anti-hypertensive regimen is listed below:    Current Antihypertensives    amLODIPine tablet 10 mg, Daily, Oral    Plan    - BP is controlled, no changes needed to their regimen  - follow  BP okay    12/21 - BP a little low. Will reduce amlodipine to 5mg.       VTE Risk Mitigation (From admission, onward)           Ordered     Reason for No Pharmacological VTE Prophylaxis  Once        Question:  Reasons:  Answer:  Physician Provided (leave comment)  Comment:  Procedure in AM (thoracentesis)    12/18/24 1948     Place SIS hose  Until discontinued         12/18/24 1948     IP VTE LOW RISK PATIENT  Once         12/18/24 1948                    Discharge Planning   RAHAT:      Code Status: Full Code   Medical Readiness for Discharge Date:   Discharge Plan A: Home with family                        Joao Michel Jr, MD  Department of Hospital Medicine   [facility]

## 2024-12-21 NOTE — ASSESSMENT & PLAN NOTE
Patient's blood pressure range in the last 24 hours was: BP  Min: 104/67  Max: 131/79.The patient's inpatient anti-hypertensive regimen is listed below:    Current Antihypertensives    amLODIPine tablet 10 mg, Daily, Oral    Plan    - BP is controlled, no changes needed to their regimen  - follow  BP okay    12/21 - BP a little low. Will reduce amlodipine to 5mg.

## 2024-12-21 NOTE — ASSESSMENT & PLAN NOTE
There is an opacification in the left upper lobe, consistent with lung nodule versus rounded atelectasis/localized fluid collection.  In the context of his unilateral pleural effusion, possible endobronchial lesion in the left lower lobe bronchus (versus mucus plugging), malignancy is on the differential.  This needs further evaluation with additional/interval imaging (PET-CT scan will allow us to perform interval imaging as well as look for any FDG avid areas).  After that, we will determine with the patient requires navigational bronchoscopy versus flexible/rigid bronchoscopy alone.  Cytology is also pending at this time which will help make that determination.  Negative cytology is not very helpful due to the diagnostic yield of pleural fluid malignancy, however if it is positive it will help determine next steps.      I have discussed the above with the patient.  We will schedule him for a PET-CT scan as soon as possible as an outpatient and schedule him for an outpatient bronchoscopy pending his PET-CT scan.

## 2024-12-21 NOTE — SUBJECTIVE & OBJECTIVE
Interval History: No acute complaints. Dr. Saleem to remove chest tube.     Review of Systems  Objective:     Vital Signs (Most Recent):  Temp: 98 °F (36.7 °C) (12/21/24 1107)  Pulse: 98 (12/21/24 1331)  Resp: 16 (12/21/24 1331)  BP: 104/67 (12/21/24 1107)  SpO2: 96 % (12/21/24 1331) Vital Signs (24h Range):  Temp:  [98 °F (36.7 °C)-98.3 °F (36.8 °C)] 98 °F (36.7 °C)  Pulse:  [] 98  Resp:  [16-20] 16  SpO2:  [93 %-98 %] 96 %  BP: (104-131)/(67-79) 104/67     Weight: 76.2 kg (168 lb)  Body mass index is 25.54 kg/m².    Intake/Output Summary (Last 24 hours) at 12/21/2024 1341  Last data filed at 12/21/2024 0515  Gross per 24 hour   Intake --   Output 940 ml   Net -940 ml         Physical Exam  Vitals and nursing note reviewed.   Constitutional:       Appearance: Normal appearance. He is not ill-appearing.   HENT:      Nose: Nose normal.      Mouth/Throat:      Mouth: Mucous membranes are moist.   Eyes:      Pupils: Pupils are equal, round, and reactive to light.   Cardiovascular:      Rate and Rhythm: Normal rate and regular rhythm.      Heart sounds: Normal heart sounds.   Pulmonary:      Effort: Pulmonary effort is normal.      Breath sounds: Normal breath sounds.      Comments: Chest tube in place  Abdominal:      Palpations: Abdomen is soft.   Musculoskeletal:         General: Normal range of motion.      Cervical back: Neck supple.   Skin:     General: Skin is warm and dry.   Neurological:      Mental Status: He is alert and oriented to person, place, and time.             Significant Labs: All pertinent labs within the past 24 hours have been reviewed.  BMP:   Recent Labs   Lab 12/20/24  0435 12/21/24  0546   * 112   * 132*   K 4.7 3.6    100   CO2 23 23   BUN 10 7*   CREATININE 0.78 0.71*   CALCIUM 8.4* 8.5*   MG 2.4  --      CBC:   Recent Labs   Lab 12/20/24  0435 12/21/24  0546   WBC 15.69* 9.94   HGB 14.8 13.8   HCT 44.4 40.9   * 433*       Significant Imaging: I have reviewed  all pertinent imaging results/findings within the past 24 hours.

## 2024-12-22 PROCEDURE — 25000003 PHARM REV CODE 250: Performed by: INTERNAL MEDICINE

## 2024-12-22 PROCEDURE — 94640 AIRWAY INHALATION TREATMENT: CPT

## 2024-12-22 PROCEDURE — 25000242 PHARM REV CODE 250 ALT 637 W/ HCPCS: Performed by: NURSE PRACTITIONER

## 2024-12-22 PROCEDURE — 25000003 PHARM REV CODE 250: Performed by: FAMILY MEDICINE

## 2024-12-22 PROCEDURE — 27000221 HC OXYGEN, UP TO 24 HOURS

## 2024-12-22 PROCEDURE — 99900035 HC TECH TIME PER 15 MIN (STAT)

## 2024-12-22 PROCEDURE — 63600175 PHARM REV CODE 636 W HCPCS: Performed by: NURSE PRACTITIONER

## 2024-12-22 PROCEDURE — 25000003 PHARM REV CODE 250: Performed by: NURSE PRACTITIONER

## 2024-12-22 PROCEDURE — 11000001 HC ACUTE MED/SURG PRIVATE ROOM

## 2024-12-22 PROCEDURE — 94761 N-INVAS EAR/PLS OXIMETRY MLT: CPT

## 2024-12-22 PROCEDURE — 99232 SBSQ HOSP IP/OBS MODERATE 35: CPT | Mod: ,,, | Performed by: FAMILY MEDICINE

## 2024-12-22 RX ORDER — SELENIUM 50 MCG
1 TABLET ORAL
Status: DISCONTINUED | OUTPATIENT
Start: 2024-12-22 | End: 2024-12-23 | Stop reason: HOSPADM

## 2024-12-22 RX ORDER — LOPERAMIDE HYDROCHLORIDE 2 MG/1
2 CAPSULE ORAL 4 TIMES DAILY PRN
Status: DISCONTINUED | OUTPATIENT
Start: 2024-12-22 | End: 2024-12-23 | Stop reason: HOSPADM

## 2024-12-22 RX ORDER — THIAMINE HCL 100 MG
400 TABLET ORAL 3 TIMES DAILY
Status: DISCONTINUED | OUTPATIENT
Start: 2024-12-22 | End: 2024-12-23 | Stop reason: HOSPADM

## 2024-12-22 RX ADMIN — IPRATROPIUM BROMIDE AND ALBUTEROL SULFATE 3 ML: .5; 3 SOLUTION RESPIRATORY (INHALATION) at 07:12

## 2024-12-22 RX ADMIN — PIPERACILLIN SODIUM AND TAZOBACTAM SODIUM 4.5 G: 4; .5 INJECTION, POWDER, LYOPHILIZED, FOR SOLUTION INTRAVENOUS at 09:12

## 2024-12-22 RX ADMIN — AMLODIPINE BESYLATE 5 MG: 5 TABLET ORAL at 09:12

## 2024-12-22 RX ADMIN — POLYETHYLENE GLYCOL 3350 17 G: 17 POWDER, FOR SOLUTION ORAL at 09:12

## 2024-12-22 RX ADMIN — Medication 1 CAPSULE: at 04:12

## 2024-12-22 RX ADMIN — PIPERACILLIN SODIUM AND TAZOBACTAM SODIUM 4.5 G: 4; .5 INJECTION, POWDER, LYOPHILIZED, FOR SOLUTION INTRAVENOUS at 05:12

## 2024-12-22 RX ADMIN — IPRATROPIUM BROMIDE AND ALBUTEROL SULFATE 3 ML: .5; 3 SOLUTION RESPIRATORY (INHALATION) at 01:12

## 2024-12-22 RX ADMIN — FOLIC ACID 1 MG: 1 TABLET ORAL at 09:12

## 2024-12-22 RX ADMIN — PIPERACILLIN SODIUM AND TAZOBACTAM SODIUM 4.5 G: 4; .5 INJECTION, POWDER, LYOPHILIZED, FOR SOLUTION INTRAVENOUS at 02:12

## 2024-12-22 RX ADMIN — SENNOSIDES AND DOCUSATE SODIUM 1 TABLET: 50; 8.6 TABLET ORAL at 09:12

## 2024-12-22 NOTE — ASSESSMENT & PLAN NOTE
Patient's blood pressure range in the last 24 hours was: BP  Min: 114/72  Max: 138/76.The patient's inpatient anti-hypertensive regimen is listed below:    Current Antihypertensives    amLODIPine tablet 10 mg, Daily, Oral    Plan    - BP is controlled, no changes needed to their regimen  - follow  BP okay    12/21 - BP a little low. Will reduce amlodipine to 5mg.

## 2024-12-22 NOTE — ASSESSMENT & PLAN NOTE
On CT of chest with contrast, patient was found to have a large left pleural effusion with complete collapse of the left lung and a 2 cm ground-glass opacification within the right lower lobe. A thoracentesis was ordered. Most likely etiology includes  parapneumonic effusion vs pneumonia vs malignancy . Will consult IR for drainage.     Pleural fluid labs/micro ordered  Empiric coverage with Vanc and Zosyn  Consult IR for thoracentesis    Thoracentesis today  Marked improvement in aeration of the left lung.  Chest x-ray reviewed and independently interpreted.    12/21 - CT removed after my visit. D/w Dr. Saleem.   12/22 - Cytology pending, concerns for malignant effusion.

## 2024-12-22 NOTE — ASSESSMENT & PLAN NOTE
This patient does have evidence of infective focus  My overall impression is sepsis.  Source: Respiratory  Antibiotics given-   Antibiotics (72h ago, onward)      Start     Stop Route Frequency Ordered    12/18/24 2200  piperacillin-tazobactam (ZOSYN) 4.5 g in D5W 100 mL IVPB (MB+)         -- IV Every 8 hours (non-standard times) 12/18/24 2049            Fluid challenge Not needed - patient is not hypotensive      Post- resuscitation assessment No - Post resuscitation assessment not needed       Will Not start Pressors- Levophed for MAP of 65  Source control achieved by: Empiric ABX  Continue antibiotics.  Discussed case with Dr. Saleem believes this may be malignancy.  CT chest abdomen pelvis  12/21 - Complete course of abx.  Likely home Monday with pulmonary followup ( for malignancy eval)  12/22 - Complete abx tomorrow, then likely d/c home.

## 2024-12-22 NOTE — SUBJECTIVE & OBJECTIVE
Interval History: Feeling better, More comfortable with chest tube out.     Review of Systems  Objective:     Vital Signs (Most Recent):  Temp: 99 °F (37.2 °C) (12/22/24 1559)  Pulse: 89 (12/22/24 1559)  Resp: 18 (12/22/24 1559)  BP: 114/72 (12/22/24 1559)  SpO2: 95 % (12/22/24 1559) Vital Signs (24h Range):  Temp:  [97.6 °F (36.4 °C)-99 °F (37.2 °C)] 99 °F (37.2 °C)  Pulse:  [80-98] 89  Resp:  [16-19] 18  SpO2:  [95 %-100 %] 95 %  BP: (114-138)/(68-77) 114/72     Weight: 76.2 kg (168 lb)  Body mass index is 25.54 kg/m².  No intake or output data in the 24 hours ending 12/22/24 1614      Physical Exam  Vitals and nursing note reviewed.   Constitutional:       Appearance: Normal appearance. He is not ill-appearing.   HENT:      Nose: Nose normal.      Mouth/Throat:      Mouth: Mucous membranes are moist.   Eyes:      Pupils: Pupils are equal, round, and reactive to light.   Cardiovascular:      Rate and Rhythm: Normal rate and regular rhythm.      Heart sounds: Normal heart sounds.   Pulmonary:      Effort: Pulmonary effort is normal. No respiratory distress.      Breath sounds: Normal breath sounds. No wheezing or rales.   Abdominal:      Palpations: Abdomen is soft.   Musculoskeletal:         General: Normal range of motion.      Cervical back: Neck supple.   Skin:     General: Skin is warm and dry.   Neurological:      General: No focal deficit present.      Mental Status: He is alert and oriented to person, place, and time.             Significant Labs: All pertinent labs within the past 24 hours have been reviewed.  BMP:   Recent Labs   Lab 12/21/24  0546      *   K 3.6      CO2 23   BUN 7*   CREATININE 0.71*   CALCIUM 8.5*     CBC:   Recent Labs   Lab 12/21/24  0546   WBC 9.94   HGB 13.8   HCT 40.9   *       Significant Imaging: I have reviewed all pertinent imaging results/findings within the past 24 hours.

## 2024-12-22 NOTE — PLAN OF CARE
Problem: Adult Inpatient Plan of Care  Goal: Plan of Care Review  Outcome: Progressing  Goal: Patient-Specific Goal (Individualized)  Outcome: Progressing  Goal: Absence of Hospital-Acquired Illness or Injury  Outcome: Progressing  Goal: Optimal Comfort and Wellbeing  Outcome: Progressing  Goal: Readiness for Transition of Care  Outcome: Progressing     Problem: Violence Risk or Actual  Goal: Anger and Impulse Control  Outcome: Progressing  Intervention: Minimize Safety Risk  Flowsheets (Taken 12/21/2024 2046)  Behavior Management: behavioral plan reviewed  Sensory Stimulation Regulation: television on  De-Escalation Techniques: appropriate behavior reinforced     Problem: Sepsis/Septic Shock  Goal: Optimal Coping  Outcome: Progressing  Goal: Absence of Bleeding  Outcome: Progressing  Goal: Blood Glucose Level Within Targeted Range  Outcome: Progressing  Goal: Absence of Infection Signs and Symptoms  Outcome: Progressing  Goal: Optimal Nutrition Intake  Outcome: Progressing     Problem: Pneumonia  Goal: Fluid Balance  Outcome: Progressing  Goal: Resolution of Infection Signs and Symptoms  Outcome: Progressing  Goal: Effective Oxygenation and Ventilation  Outcome: Progressing     Problem: Fall Injury Risk  Goal: Absence of Fall and Fall-Related Injury  Outcome: Progressing     Problem: Breathing Pattern Ineffective  Goal: Effective Breathing Pattern  Outcome: Progressing  Intervention: Promote Improved Breathing Pattern  Flowsheets (Taken 12/21/2024 2046)  Airway/Ventilation Management: airway patency maintained  Head of Bed (HOB) Positioning: HOB at 30-45 degrees     Problem: Gas Exchange Impaired  Goal: Optimal Gas Exchange  Outcome: Progressing

## 2024-12-22 NOTE — PLAN OF CARE
Problem: Gas Exchange Impaired  Goal: Optimal Gas Exchange  12/22/2024 1553 by Lauren Hartmann, RRT  Outcome: Met  12/22/2024 0941 by Lauren Hartmann, RRT  Outcome: Progressing

## 2024-12-22 NOTE — PROGRESS NOTES
[facility] Hospital Medicine  Progress Note    Patient Name: Isidoro Martinez  MRN: [id number]  Patient Class: IP- Inpatient   Admission Date: 12/18/2024  Length of Stay: 4 days  Attending Physician: Joao Michel Jr., MD  Primary Care Provider: Gavino Miranda MD        Subjective     Principal Problem:Pleural effusion        HPI:  The patient, a 69-year-old, with a hx of HTN and Daily alcohol consumtion reported feeling unwell for the past three to four days, experiencing shortness of breath and cough productive of sputum with some color. The patient visited Dr. Malone, who performed a chest X-ray and referred the patient to the emergency room.  The patient mentioned a history of feeling funny about 2 weeks ago with sharp chest pains, He was evaluated by his PCP, which prompted an EKG that returned normal. The patient reported no history of similar respiratory issues. The patient did not report fever, chills, chest pain, or leg swelling. Patient endorses daily consumption of 6 pack of beer with last use 4 days ago.    In the ED initial vitals were /82, , T 98.5° F, SpO2 94% on room air.  Initial labs show WBC of 16.74 with a platelet count of 524 remainder of CBC is unremarkable CMP shows a sodium of 133 glucose of 154 otherwise unremarkable. CXR shows complete whiteout of the left hemithorax consistent with large pleural effusion.  Ct chest pending.    Patient will be admitted to hospital medicine service under the direct supervision of Dr ARREAGA for further evaluation and management.   IR drainage of pleural effusion with labs is ordered, however will consult Gen surgery as there is a large fluid collection outside of thorax that may communicate with pleural effusion. CT report pending    Overview/Hospital Course:  12/19 short of breath tachypneic today.  Plan for thoracentesis  12/20 breathing better today    Interval History: Feeling better, More comfortable with  chest tube out.     Review of Systems  Objective:     Vital Signs (Most Recent):  Temp: 99 °F (37.2 °C) (12/22/24 1559)  Pulse: 89 (12/22/24 1559)  Resp: 18 (12/22/24 1559)  BP: 114/72 (12/22/24 1559)  SpO2: 95 % (12/22/24 1559) Vital Signs (24h Range):  Temp:  [97.6 °F (36.4 °C)-99 °F (37.2 °C)] 99 °F (37.2 °C)  Pulse:  [80-98] 89  Resp:  [16-19] 18  SpO2:  [95 %-100 %] 95 %  BP: (114-138)/(68-77) 114/72     Weight: 76.2 kg (168 lb)  Body mass index is 25.54 kg/m².  No intake or output data in the 24 hours ending 12/22/24 1614      Physical Exam  Vitals and nursing note reviewed.   Constitutional:       Appearance: Normal appearance. He is not ill-appearing.   HENT:      Nose: Nose normal.      Mouth/Throat:      Mouth: Mucous membranes are moist.   Eyes:      Pupils: Pupils are equal, round, and reactive to light.   Cardiovascular:      Rate and Rhythm: Normal rate and regular rhythm.      Heart sounds: Normal heart sounds.   Pulmonary:      Effort: Pulmonary effort is normal. No respiratory distress.      Breath sounds: Normal breath sounds. No wheezing or rales.   Abdominal:      Palpations: Abdomen is soft.   Musculoskeletal:         General: Normal range of motion.      Cervical back: Neck supple.   Skin:     General: Skin is warm and dry.   Neurological:      General: No focal deficit present.      Mental Status: He is alert and oriented to person, place, and time.             Significant Labs: All pertinent labs within the past 24 hours have been reviewed.  BMP:   Recent Labs   Lab 12/21/24  0546      *   K 3.6      CO2 23   BUN 7*   CREATININE 0.71*   CALCIUM 8.5*     CBC:   Recent Labs   Lab 12/21/24  0546   WBC 9.94   HGB 13.8   HCT 40.9   *       Significant Imaging: I have reviewed all pertinent imaging results/findings within the past 24 hours.    Assessment and Plan     * Pleural effusion    On CT of chest with contrast, patient was found to have a large left pleural effusion  with complete collapse of the left lung and a 2 cm ground-glass opacification within the right lower lobe. A thoracentesis was ordered. Most likely etiology includes  parapneumonic effusion vs pneumonia vs malignancy . Will consult IR for drainage.     Pleural fluid labs/micro ordered  Empiric coverage with Vanc and Zosyn  Consult IR for thoracentesis    Thoracentesis today  Marked improvement in aeration of the left lung.  Chest x-ray reviewed and independently interpreted.    12/21 - CT removed after my visit. D/w Dr. Saleem.   12/22 - Cytology pending, concerns for malignant effusion.     Sepsis due to pneumonia    This patient does have evidence of infective focus  My overall impression is sepsis.  Source: Respiratory  Antibiotics given-   Antibiotics (72h ago, onward)      Start     Stop Route Frequency Ordered    12/18/24 2200  piperacillin-tazobactam (ZOSYN) 4.5 g in D5W 100 mL IVPB (MB+)         -- IV Every 8 hours (non-standard times) 12/18/24 2049            Fluid challenge Not needed - patient is not hypotensive      Post- resuscitation assessment No - Post resuscitation assessment not needed       Will Not start Pressors- Levophed for MAP of 65  Source control achieved by: Empiric ABX  Continue antibiotics.  Discussed case with Dr. Saleem believes this may be malignancy.  CT chest abdomen pelvis  12/21 - Complete course of abx.  Likely home Monday with pulmonary followup ( for malignancy eval)  12/22 - Complete abx tomorrow, then likely d/c home.     Nodule of upper lobe of left lung        Acidosis  Resolved      Tobacco abuse        Alcohol dependency  Pt consumes at least 1 6 pk of Beer daily  Last drink 4 days ago  DT Precautions  CIWA Q shift  Ativan q 2 prn seizures, severe agitation  Thiamine and folate supplementation    CIWA 4  CIWA 0  CIWA 0 - monitor.     Hypertension    Patient's blood pressure range in the last 24 hours was: BP  Min: 114/72  Max: 138/76.The patient's inpatient anti-hypertensive  regimen is listed below:    Current Antihypertensives    amLODIPine tablet 10 mg, Daily, Oral    Plan    - BP is controlled, no changes needed to their regimen  - follow  BP okay    12/21 - BP a little low. Will reduce amlodipine to 5mg.       VTE Risk Mitigation (From admission, onward)           Ordered     Reason for No Pharmacological VTE Prophylaxis  Once        Question:  Reasons:  Answer:  Physician Provided (leave comment)  Comment:  Procedure in AM (thoracentesis)    12/18/24 1948     Place SIS hose  Until discontinued         12/18/24 1948     IP VTE LOW RISK PATIENT  Once         12/18/24 1948                    Discharge Planning   RAHAT:      Code Status: Full Code   Medical Readiness for Discharge Date:   Discharge Plan A: Home with family                        Joao Michel Jr, MD  Department of Hospital Medicine   [facility] - [facility]

## 2024-12-22 NOTE — PLAN OF CARE
Problem: Pneumonia  Goal: Effective Oxygenation and Ventilation  Outcome: Progressing     Problem: Breathing Pattern Ineffective  Goal: Effective Breathing Pattern  Outcome: Progressing     Problem: Gas Exchange Impaired  Goal: Optimal Gas Exchange  Outcome: Progressing

## 2024-12-23 ENCOUNTER — TELEPHONE (OUTPATIENT)
Dept: PULMONOLOGY | Facility: CLINIC | Age: 69
End: 2024-12-23
Payer: MEDICARE

## 2024-12-23 VITALS
SYSTOLIC BLOOD PRESSURE: 118 MMHG | BODY MASS INDEX: 25.46 KG/M2 | WEIGHT: 168 LBS | HEART RATE: 83 BPM | TEMPERATURE: 98 F | HEIGHT: 68 IN | OXYGEN SATURATION: 96 % | RESPIRATION RATE: 20 BRPM | DIASTOLIC BLOOD PRESSURE: 76 MMHG

## 2024-12-23 DIAGNOSIS — R91.8 MULTIPLE PULMONARY NODULES: Primary | ICD-10-CM

## 2024-12-23 LAB
BACTERIA SPEC BFLD CULT: NORMAL
ESTROGEN SERPL-MCNC: NORMAL PG/ML
INSULIN SERPL-ACNC: NORMAL U[IU]/ML
LAB AP CLINICAL INFORMATION: NORMAL
LAB AP COMMENTS: NORMAL
LAB AP GROSS DESCRIPTION: NORMAL
LAB AP LABORATORY NOTES: NORMAL
LAB AP SPECIMEN A NON-GYN GENERAL CATEGORIZATION: POSITIVE
LAB AP SPECIMEN A NON-GYN INTERPETATION: NORMAL

## 2024-12-23 PROCEDURE — 99239 HOSP IP/OBS DSCHRG MGMT >30: CPT | Mod: ,,, | Performed by: STUDENT IN AN ORGANIZED HEALTH CARE EDUCATION/TRAINING PROGRAM

## 2024-12-23 PROCEDURE — 63600175 PHARM REV CODE 636 W HCPCS: Performed by: NURSE PRACTITIONER

## 2024-12-23 PROCEDURE — 25000242 PHARM REV CODE 250 ALT 637 W/ HCPCS: Performed by: NURSE PRACTITIONER

## 2024-12-23 PROCEDURE — 1111F DSCHRG MED/CURRENT MED MERGE: CPT | Mod: CPTII,,, | Performed by: STUDENT IN AN ORGANIZED HEALTH CARE EDUCATION/TRAINING PROGRAM

## 2024-12-23 PROCEDURE — 25000003 PHARM REV CODE 250: Performed by: NURSE PRACTITIONER

## 2024-12-23 PROCEDURE — 25000003 PHARM REV CODE 250: Performed by: STUDENT IN AN ORGANIZED HEALTH CARE EDUCATION/TRAINING PROGRAM

## 2024-12-23 PROCEDURE — 25000003 PHARM REV CODE 250: Performed by: INTERNAL MEDICINE

## 2024-12-23 PROCEDURE — 99900035 HC TECH TIME PER 15 MIN (STAT)

## 2024-12-23 PROCEDURE — 25000003 PHARM REV CODE 250: Performed by: FAMILY MEDICINE

## 2024-12-23 PROCEDURE — 94640 AIRWAY INHALATION TREATMENT: CPT

## 2024-12-23 PROCEDURE — 94761 N-INVAS EAR/PLS OXIMETRY MLT: CPT

## 2024-12-23 RX ORDER — HYDROCODONE BITARTRATE AND ACETAMINOPHEN 5; 325 MG/1; MG/1
1 TABLET ORAL EVERY 6 HOURS PRN
Qty: 12 TABLET | Refills: 0 | Status: SHIPPED | OUTPATIENT
Start: 2024-12-23

## 2024-12-23 RX ORDER — SELENIUM 50 MCG
1 TABLET ORAL
Qty: 42 CAPSULE | Refills: 0 | Status: SHIPPED | OUTPATIENT
Start: 2024-12-23 | End: 2025-01-06

## 2024-12-23 RX ORDER — AMOXICILLIN AND CLAVULANATE POTASSIUM 875; 125 MG/1; MG/1
1 TABLET, FILM COATED ORAL 2 TIMES DAILY
Qty: 10 TABLET | Refills: 0 | Status: SHIPPED | OUTPATIENT
Start: 2024-12-23 | End: 2024-12-28

## 2024-12-23 RX ADMIN — AMLODIPINE BESYLATE 5 MG: 5 TABLET ORAL at 08:12

## 2024-12-23 RX ADMIN — PIPERACILLIN SODIUM AND TAZOBACTAM SODIUM 4.5 G: 4; .5 INJECTION, POWDER, LYOPHILIZED, FOR SOLUTION INTRAVENOUS at 05:12

## 2024-12-23 RX ADMIN — SENNOSIDES AND DOCUSATE SODIUM 1 TABLET: 50; 8.6 TABLET ORAL at 08:12

## 2024-12-23 RX ADMIN — Medication 400 MG: at 08:12

## 2024-12-23 RX ADMIN — FOLIC ACID 1 MG: 1 TABLET ORAL at 08:12

## 2024-12-23 RX ADMIN — IPRATROPIUM BROMIDE AND ALBUTEROL SULFATE 3 ML: .5; 3 SOLUTION RESPIRATORY (INHALATION) at 12:12

## 2024-12-23 RX ADMIN — Medication 1 CAPSULE: at 08:12

## 2024-12-23 RX ADMIN — IPRATROPIUM BROMIDE AND ALBUTEROL SULFATE 3 ML: .5; 3 SOLUTION RESPIRATORY (INHALATION) at 07:12

## 2024-12-23 NOTE — PLAN OF CARE
Problem: Adult Inpatient Plan of Care  Goal: Plan of Care Review  Outcome: Progressing  Goal: Patient-Specific Goal (Individualized)  Outcome: Progressing  Goal: Absence of Hospital-Acquired Illness or Injury  Outcome: Progressing  Goal: Optimal Comfort and Wellbeing  Outcome: Progressing  Goal: Readiness for Transition of Care  Outcome: Progressing     Problem: Violence Risk or Actual  Goal: Anger and Impulse Control  Outcome: Progressing     Problem: Sepsis/Septic Shock  Goal: Optimal Coping  Outcome: Progressing  Goal: Absence of Bleeding  Outcome: Progressing  Goal: Blood Glucose Level Within Targeted Range  Outcome: Progressing  Goal: Absence of Infection Signs and Symptoms  Outcome: Progressing  Goal: Optimal Nutrition Intake  Outcome: Progressing     Problem: Pneumonia  Goal: Fluid Balance  Outcome: Progressing  Goal: Resolution of Infection Signs and Symptoms  Outcome: Progressing  Goal: Effective Oxygenation and Ventilation  Outcome: Progressing     Problem: Fall Injury Risk  Goal: Absence of Fall and Fall-Related Injury  Outcome: Progressing     Problem: Breathing Pattern Ineffective  Goal: Effective Breathing Pattern  Outcome: Progressing

## 2024-12-23 NOTE — ASSESSMENT & PLAN NOTE
This patient does have evidence of infective focus  My overall impression is sepsis.  Source: Respiratory  Antibiotics given-   Antibiotics (72h ago, onward)      Start     Stop Route Frequency Ordered    12/18/24 2200  piperacillin-tazobactam (ZOSYN) 4.5 g in D5W 100 mL IVPB (MB+)         -- IV Every 8 hours (non-standard times) 12/18/24 2049            Fluid challenge Not needed - patient is not hypotensive      Post- resuscitation assessment No - Post resuscitation assessment not needed       Will Not start Pressors- Levophed for MAP of 65  Source control achieved by: Empiric ABX  Continue antibiotics.  Discussed case with Dr. Saleem believes this may be malignancy.  CT chest abdomen pelvis  12/21 - Complete course of abx.  Likely home Monday with pulmonary followup ( for malignancy eval)  12/22 - Complete abx tomorrow, then likely d/c home.   Five more days Augmentin.

## 2024-12-23 NOTE — ASSESSMENT & PLAN NOTE
On CT of chest with contrast, patient was found to have a large left pleural effusion with complete collapse of the left lung and a 2 cm ground-glass opacification within the right lower lobe. A thoracentesis was ordered. Most likely etiology includes  parapneumonic effusion vs pneumonia vs malignancy . Will consult IR for drainage.     Pleural fluid labs/micro ordered  Empiric coverage with Vanc and Zosyn  Consult IR for thoracentesis    Thoracentesis today  Marked improvement in aeration of the left lung.  Chest x-ray reviewed and independently interpreted.    12/21 - CT removed after my visit. D/w Dr. Saleem.   12/22 - Cytology pending, concerns for malignant effusion.   12/23 PET-CT outpatient.  Follow up Dr. Saleem

## 2024-12-23 NOTE — PLAN OF CARE
Problem: Adult Inpatient Plan of Care  Goal: Absence of Hospital-Acquired Illness or Injury  Outcome: Progressing     Problem: Pneumonia  Goal: Effective Oxygenation and Ventilation  Outcome: Progressing     Problem: Breathing Pattern Ineffective  Goal: Effective Breathing Pattern  Outcome: Progressing

## 2024-12-23 NOTE — ASSESSMENT & PLAN NOTE
Patient's blood pressure range in the last 24 hours was: BP  Min: 112/70  Max: 138/76.The patient's inpatient anti-hypertensive regimen is listed below:    Current Antihypertensives    amLODIPine tablet 10 mg, Daily, Oral    Plan    - BP is controlled, no changes needed to their regimen  - follow  BP okay    12/21 - BP a little low. Will reduce amlodipine to 5mg.

## 2024-12-23 NOTE — NURSING
Discharge instructions reviewed with patient; copy given to patient. Patient voiced understanding regarding: meds, appt., signs and symptoms to report to physician.

## 2024-12-23 NOTE — TELEPHONE ENCOUNTER
----- Message from Edgar Saleem MD sent at 12/21/2024 12:04 PM CST -----  Please schedule this patient for PET-CT scan as soon as possible, next week on the twenty-sixth or twenty-seventh of December if possible.  Please then schedule the patient to come and see me as a clinic visit on Tuesday after the PET-CT scan on 12/31/24.    Thank you,     Edgar Saleem MD  Interventional Pulmonary and Critical Care  [facility]

## 2024-12-23 NOTE — DISCHARGE SUMMARY
[facility] Hospital Medicine  Discharge Summary      Patient Name: Isidoro Martinez  MRN: [id number]  APRIL: [id number]  Patient Class: IP- Inpatient  Admission Date: 12/18/2024  Hospital Length of Stay: 5 days  Discharge Date and Time:  12/23/2024 9:39 AM  Attending Physician: Karl Rascon DO   Discharging Provider: Karl Rascon DO  Primary Care Provider: Gavino Miranda MD    Primary Care Team: Networked reference to record PCT     HPI:   The patient, a 69-year-old, with a hx of HTN and Daily alcohol consumtion reported feeling unwell for the past three to four days, experiencing shortness of breath and cough productive of sputum with some color. The patient visited Dr. Malone, who performed a chest X-ray and referred the patient to the emergency room.  The patient mentioned a history of feeling funny about 2 weeks ago with sharp chest pains, He was evaluated by his PCP, which prompted an EKG that returned normal. The patient reported no history of similar respiratory issues. The patient did not report fever, chills, chest pain, or leg swelling. Patient endorses daily consumption of 6 pack of beer with last use 4 days ago.    In the ED initial vitals were /82, , T 98.5° F, SpO2 94% on room air.  Initial labs show WBC of 16.74 with a platelet count of 524 remainder of CBC is unremarkable CMP shows a sodium of 133 glucose of 154 otherwise unremarkable. CXR shows complete whiteout of the left hemithorax consistent with large pleural effusion.  Ct chest pending.    Patient will be admitted to hospital medicine service under the direct supervision of Dr ARREAGA for further evaluation and management.   IR drainage of pleural effusion with labs is ordered, however will consult Gen surgery as there is a large fluid collection outside of thorax that may communicate with pleural effusion. CT report pending    * No surgery found *      Hospital Course:   12/19 short of breath  tachypneic today.  Plan for thoracentesis  12/20 breathing better today  12/23 stable for discharge.  Follow up with Dr. Saleem we will have PET-CT imaging prior to follow up.  Follow up with Dr. Miranda in 1 week.  We will give 5 more days of Augmentin     Goals of Care Treatment Preferences:  Code Status: Full Code      SDOH Screening:  The patient was screened for food insecurity, housing instability, transportation needs, utility difficulties, and interpersonal safety. The social determinant(s) of health identified as a concern this admission are:  Utility difficulties  Transportation difficulties    The plan to address these concerns is:  Community resources given    Social Drivers of Health with Concerns     Transportation Needs: Unmet Transportation Needs (12/18/2024)   Utilities: At Risk (12/18/2024)        Consults:   Consults (From admission, onward)          Status Ordering Provider     Inpatient consult to Pulmonology  Once        Provider:  Tobias Hernandez MD    Completed JAMES PAYTON            * Pleural effusion    On CT of chest with contrast, patient was found to have a large left pleural effusion with complete collapse of the left lung and a 2 cm ground-glass opacification within the right lower lobe. A thoracentesis was ordered. Most likely etiology includes  parapneumonic effusion vs pneumonia vs malignancy . Will consult IR for drainage.     Pleural fluid labs/micro ordered  Empiric coverage with Vanc and Zosyn  Consult IR for thoracentesis    Thoracentesis today  Marked improvement in aeration of the left lung.  Chest x-ray reviewed and independently interpreted.    12/21 - CT removed after my visit. D/w Dr. Saleem.   12/22 - Cytology pending, concerns for malignant effusion.   12/23 PET-CT outpatient.  Follow up Dr. Saleem    Nodule of upper lobe of left lung  Outpatient follow up      Acidosis  Resolved      Tobacco abuse        Sepsis due to pneumonia    This patient does have evidence of  infective focus  My overall impression is sepsis.  Source: Respiratory  Antibiotics given-   Antibiotics (72h ago, onward)      Start     Stop Route Frequency Ordered    12/18/24 2200  piperacillin-tazobactam (ZOSYN) 4.5 g in D5W 100 mL IVPB (MB+)         -- IV Every 8 hours (non-standard times) 12/18/24 2049            Fluid challenge Not needed - patient is not hypotensive      Post- resuscitation assessment No - Post resuscitation assessment not needed       Will Not start Pressors- Levophed for MAP of 65  Source control achieved by: Empiric ABX  Continue antibiotics.  Discussed case with Dr. Saleem believes this may be malignancy.  CT chest abdomen pelvis  12/21 - Complete course of abx.  Likely home Monday with pulmonary followup ( for malignancy eval)  12/22 - Complete abx tomorrow, then likely d/c home.   Five more days Augmentin.    Alcohol dependency  Pt consumes at least 1 6 pk of Beer daily  Last drink 4 days ago  DT Precautions  CIWA Q shift  Ativan q 2 prn seizures, severe agitation  Thiamine and folate supplementation    CIWA 4  CIWA 0  CIWA 0 - monitor.     Hypertension    Patient's blood pressure range in the last 24 hours was: BP  Min: 112/70  Max: 138/76.The patient's inpatient anti-hypertensive regimen is listed below:    Current Antihypertensives    amLODIPine tablet 10 mg, Daily, Oral    Plan    - BP is controlled, no changes needed to their regimen  - follow  BP okay    12/21 - BP a little low. Will reduce amlodipine to 5mg.       Final Active Diagnoses:    Diagnosis Date Noted POA    PRINCIPAL PROBLEM:  Pleural effusion [J90] 12/18/2024 Yes    Nodule of upper lobe of left lung [R91.1] 12/21/2024 Yes    Sepsis due to pneumonia [J18.9, A41.9] 12/19/2024 Yes    Tobacco abuse [Z72.0] 12/19/2024 Yes    Acidosis [E87.20] 12/19/2024 Yes    Alcohol dependency [F10.20] 12/18/2024 Yes    Hypertension [I10]  Yes      Problems Resolved During this Admission:    Diagnosis Date Noted Date Resolved POA     Encounter for chest tube removal [Z46.82] 12/21/2024 12/21/2024 Not Applicable       Discharged Condition: good    Disposition: Home or Self Care    Follow Up:   Follow-up Information       Gavino Miranda MD. Schedule an appointment as soon as possible for a visit in 1 week(s).    Specialty: Internal Medicine  Contact information:  [address] [place] [address]  [phone]               Edgar Saleem MD. Schedule an appointment as soon as possible for a visit.    Specialties: Pulmonary Disease, Critical Care Medicine  Why: Schedule an appointment for 12/31  Contact information:  [address] [place] [address]  [phone]                           Patient Instructions:      Diet Adult Regular     Activity as tolerated       Significant Diagnostic Studies: Labs: All labs within the past 24 hours have been reviewed    Pending Diagnostic Studies:       None           Medications:  Reconciled Home Medications:      Medication List        START taking these medications      amoxicillin-clavulanate 875-125mg 875-125 mg per tablet  Commonly known as: AUGMENTIN  Take 1 tablet by mouth 2 (two) times daily. for 5 days     HYDROcodone-acetaminophen 5-325 mg per tablet  Commonly known as: NORCO  Take 1 tablet by mouth every 6 (six) hours as needed for Pain.     Lactobacillus acidophilus 500 million cell Cap  Take 1 capsule by mouth 3 (three) times daily with meals. for 14 days            CONTINUE taking these medications      amLODIPine 10 MG tablet  Commonly known as: NORVASC  Take 10 mg by mouth once daily.     meloxicam 15 MG tablet  Commonly known as: MOBIC  TAKE 1 TABLET(15 MG) BY MOUTH EVERY DAY              Indwelling Lines/Drains at time of discharge:   Lines/Drains/Airways       None                   Time spent on the discharge of patient: 35 minutes         Karl Rascon DO  Department of Hospital Medicine  [facility]

## 2024-12-23 NOTE — PLAN OF CARE
[facility] Telemetry  Discharge Final Note    Primary Care Provider: Gavino Miranda MD    Expected Discharge Date: 12/23/2024    Final Discharge Note (most recent)       Final Note - 12/23/24 0948          Final Note    Assessment Type Final Discharge Note     Anticipated Discharge Disposition Home or Self Care        Post-Acute Status    Discharge Delays None known at this time                   Pt d/c home with no needs.  Important Message from Medicare  Important Message from Medicare regarding Discharge Appeal Rights: Given to patient/caregiver, Explained to patient/caregiver, Signed/date by patient/caregiver     Date IMM was signed: 12/23/24  Time IMM was signed: 0900    Contact Info       Gavino Miranda MD   Specialty: Internal Medicine   Relationship: PCP - General    [address] [place] [address]   Phone: [phone]       Next Steps: Schedule an appointment as soon as possible for a visit in 1 week(s)    Edgar Saleem MD   Specialty: Pulmonary Disease, Critical Care Medicine    [address] [place] [address]   Phone: [phone]       Next Steps: Schedule an appointment as soon as possible for a visit    Instructions: Schedule an appointment for 12/31

## 2024-12-23 NOTE — TELEPHONE ENCOUNTER
Spoke with Melania Milligan and scheduled PET-CT per Dr. Saleem, Pt is currently in hospital on Telemetry unit, this was forwarded to provider and will follow up on further directions

## 2024-12-23 NOTE — PROGRESS NOTES
Pharmacist Intervention IV to PO Note    Isidoro Martinez is a 69 y.o. male being treated with IV medication thiamine    Patient Data:    Vital Signs (Most Recent):  Temp: 99 °F (37.2 °C) (12/22/24 1559)  Pulse: 88 (12/22/24 1913)  Resp: 18 (12/22/24 1913)  BP: 114/72 (12/22/24 1559)  SpO2: 99 % (12/22/24 1913) Vital Signs (72h Range):  Temp:  [97.6 °F (36.4 °C)-99 °F (37.2 °C)]   Pulse:  []   Resp:  [16-20]   BP: (104-138)/(67-81)   SpO2:  [93 %-100 %]      CBC:  Recent Labs   Lab 12/19/24 0134 12/20/24  0435 12/21/24  0546   WBC 16.97* 15.69* 9.94   RBC 5.17 4.93 4.56*   HGB 15.6 14.8 13.8   HCT 47.2 44.4 40.9   * 499* 433*   MCV 91.3 90.1 89.7   MCH 30.2 30.0 30.3   MCHC 33.1 33.3 33.7     CMP:     Recent Labs   Lab 12/19/24 0134 12/20/24  0435 12/21/24  0546   * 118* 112   CALCIUM 9.0 8.4* 8.5*   ALBUMIN 3.0* 2.9* 2.6*   PROT 7.2 6.2 6.4   * 135* 132*   K 4.6 4.7 3.6   CO2 20* 23 23    101 100   BUN 10 10 7*   CREATININE 0.85 0.78 0.71*   ALKPHOS 67 65 54   ALT 30 28 22   AST 28 25 30   BILITOT 0.4 0.6 0.5       Dietary Orders:  Diet Orders            Diet Adult Regular Standard Tray: Regular starting at 12/19 1859            Based on the following criteria, this patient qualifies for intravenous to oral conversion:  [x] The patients gastrointestinal tract is functioning (tolerating medications via oral or enteral route for 24 hours and tolerating food or enteral feeds for 24 hours).  [x] The patient is hemodynamically stable for 24 hours (heart rate <100 beats per minute, systolic blood pressure >99 mm Hg, and respiratory rate <20 breaths per minute).  [x] The patient shows clinical improvement (afebrile for at least 24 hours and white blood cell count downtrending or normalized). Additionally, the patient must be non-neutropenic (absolute neutrophil count >500 cells/mm3).  [x] For antimicrobials, the patient has received IV therapy for at least 24 hours.    IV medication  thiamine will be changed to oral medication thiamine    Pharmacist's Name: Katherine Hoang  Pharmacist's Extension: [phone]

## 2024-12-24 ENCOUNTER — PATIENT OUTREACH (OUTPATIENT)
Dept: ADMINISTRATIVE | Facility: CLINIC | Age: 69
End: 2024-12-24

## 2024-12-24 LAB
BACTERIA BLD CULT: NORMAL
BACTERIA BLD CULT: NORMAL

## 2024-12-24 NOTE — PROGRESS NOTES
C3 nurse spoke with Isidoro Martinez  for a TCC post hospital discharge follow up call. The patient has a scheduled HOSFU appointment with Gavino Miranda MD  on 1/6/25 @ 9236.

## 2025-01-03 ENCOUNTER — OFFICE VISIT (OUTPATIENT)
Dept: PULMONOLOGY | Facility: CLINIC | Age: 70
End: 2025-01-03
Payer: MEDICARE

## 2025-01-03 VITALS
WEIGHT: 162 LBS | OXYGEN SATURATION: 97 % | HEIGHT: 68 IN | HEART RATE: 100 BPM | SYSTOLIC BLOOD PRESSURE: 112 MMHG | RESPIRATION RATE: 16 BRPM | DIASTOLIC BLOOD PRESSURE: 78 MMHG | BODY MASS INDEX: 24.55 KG/M2

## 2025-01-03 DIAGNOSIS — C34.92 ADENOCARCINOMA, LUNG, LEFT: Primary | ICD-10-CM

## 2025-01-03 DIAGNOSIS — C34.90 MALIGNANT NEOPLASM OF UNSPECIFIED PART OF UNSPECIFIED BRONCHUS OR LUNG: ICD-10-CM

## 2025-01-03 DIAGNOSIS — J90 PLEURAL EFFUSION, LEFT: ICD-10-CM

## 2025-01-03 DIAGNOSIS — R91.1 SOLITARY PULMONARY NODULE: ICD-10-CM

## 2025-01-03 PROCEDURE — 1101F PT FALLS ASSESS-DOCD LE1/YR: CPT | Mod: CPTII,,, | Performed by: STUDENT IN AN ORGANIZED HEALTH CARE EDUCATION/TRAINING PROGRAM

## 2025-01-03 PROCEDURE — 99999 PR PBB SHADOW E&M-EST. PATIENT-LVL V: CPT | Mod: PBBFAC,,, | Performed by: STUDENT IN AN ORGANIZED HEALTH CARE EDUCATION/TRAINING PROGRAM

## 2025-01-03 PROCEDURE — 3008F BODY MASS INDEX DOCD: CPT | Mod: CPTII,,, | Performed by: STUDENT IN AN ORGANIZED HEALTH CARE EDUCATION/TRAINING PROGRAM

## 2025-01-03 PROCEDURE — 1126F AMNT PAIN NOTED NONE PRSNT: CPT | Mod: CPTII,,, | Performed by: STUDENT IN AN ORGANIZED HEALTH CARE EDUCATION/TRAINING PROGRAM

## 2025-01-03 PROCEDURE — 3078F DIAST BP <80 MM HG: CPT | Mod: CPTII,,, | Performed by: STUDENT IN AN ORGANIZED HEALTH CARE EDUCATION/TRAINING PROGRAM

## 2025-01-03 PROCEDURE — 3288F FALL RISK ASSESSMENT DOCD: CPT | Mod: CPTII,,, | Performed by: STUDENT IN AN ORGANIZED HEALTH CARE EDUCATION/TRAINING PROGRAM

## 2025-01-03 PROCEDURE — 1111F DSCHRG MED/CURRENT MED MERGE: CPT | Mod: CPTII,,, | Performed by: STUDENT IN AN ORGANIZED HEALTH CARE EDUCATION/TRAINING PROGRAM

## 2025-01-03 PROCEDURE — 3074F SYST BP LT 130 MM HG: CPT | Mod: CPTII,,, | Performed by: STUDENT IN AN ORGANIZED HEALTH CARE EDUCATION/TRAINING PROGRAM

## 2025-01-03 PROCEDURE — 1159F MED LIST DOCD IN RCRD: CPT | Mod: CPTII,,, | Performed by: STUDENT IN AN ORGANIZED HEALTH CARE EDUCATION/TRAINING PROGRAM

## 2025-01-03 PROCEDURE — 99215 OFFICE O/P EST HI 40 MIN: CPT | Mod: PBBFAC | Performed by: STUDENT IN AN ORGANIZED HEALTH CARE EDUCATION/TRAINING PROGRAM

## 2025-01-03 PROCEDURE — 99215 OFFICE O/P EST HI 40 MIN: CPT | Mod: S$PBB,,, | Performed by: STUDENT IN AN ORGANIZED HEALTH CARE EDUCATION/TRAINING PROGRAM

## 2025-01-03 NOTE — PROGRESS NOTES
Patient Name: Isidoro Martinez   Primary Care Provider: Gavino Miranda MD   Date of Service: 01/03/2025   Reason for Referral:  Pulmonary adenocarcinoma    Chief Complaint: Hospital Follow Up (Hospital/PET follow up.)      Subjective:      Isidoro Martinez is 69 y.o. male with 69-year-old gentleman with past medical history significant for hypertension who presented to the hospital on 12/1924 in the setting of shortness of breath, cough which was productive.      I personally reviewed the patient's imaging (chest x-ray which showed complete opacification of left hemithorax.  There were some evidence of air bronchograms.  He also had a CT chest with contrast performed in the evening of 12/18/24 which showed evidence of a large left-sided pleural effusion with the associated compressive atelectasis along with mediastinal shift towards the contralateral side.  The patient then underwent left-sided chest tube placement by me, with pleural fluid cytology indicative of pulmonary adenocarcinoma    Initial clinic visit 1/3/25  And then reviewed the patient's PET-CT scan that I had done on 12/27/24 which showed evidence of a subcentimeter left upper lobe pulmonary nodule with left-sided atelectasis/consolidation and some increased activity without evidence of FDG avid mediastinal adenopathy or distant metastases per the official report as well.     Final Diagnosis   Left pleural fluid, thoracentesis:  Adenocarcinoma (see comment)   Electronically signed by Darío Wilson III, MD on 12/23/2024 at 1443   Specimen A General Categorization POSITIVE   Specimen A Intepretation   Positive for malignant cells   Electronically signed by Darío Wilson III, MD on 12/23/2024 at 1449   Comment    The pleural fluid includes numerous atypical  cells occurring singly and in clusters.  The atypical cells have moderately large vesicular nuclei with prominent nucleoli and moderate amounts of cytoplasm.  A panel of  immunohistochemical stains on the cell block demonstrates positive staining for TTF 1, Napsin A and cytokeratin 7.  There is negative staining for p40, cytokeratin 5/6, cytokeratin 20, calretinin, synaptophysin and chromogranin.  These results support the diagnosis of adenocarcinoma of pulmonary origin.  This case was reviewed with Dr. Frazier who concurs with the interpretation.       IMPRESSION:    0.8 cm left upper lobe FDG avid pulmonary nodule may reflect primary  lung neoplasm.    There remains left-sided atelectasis/consolidation and pleural fluid  along the left lung base and periphery of the left lung base. This  shows FDG activity similar to that of the pulmonary nodule described  above and may be reflective of a malignant effusion although  atelectatic lung could also have increased FDG activity.    No FDG avid mediastinal adenopathy, right lung lesion, or activity  elsewhere.      Assessment and Plan      Pulmonary adenocarcinoma  Left-sided pleural effusion, improving   Left upper lobe lung nodule      Assessment:  Based on current available evidence, the patient has a pulmonary adenocarcinoma, stage IV based on presence in patient's pleural fluid without evidence of distant metastases based on PET-CT scan but pending EBUS mediastinal staging and MRI brain.  Most likely source is the FDG avid left upper lobe lung nodule but given the small size of the nodule and the fact that the disease is at stage IV, there is concern that there may be an underlying lesion in the left lower lobe, although atelectasis can also have a similar appearance.  He is now a candidate for navigation bronchoscopy with attempt to sample left upper lobe lung nodule and/or left lower lobe FDG avid area.    Plan  Scheduled for navigational bronchoscopy at next available date with planning CT the day prior.  We will plan for left upper lobe lung nodule plus-minus left lower lobe FDG avid area along with EBUS mediastinal staging  plus-minus placement of fiducial marker  Ordered:Complete PFTs with pre and post bronchodilator testing and 6 minute walk test (pulmonary stress test)    I have referred him over to Radiation Oncology and Oncology at [facility] and communicated the above findings with them   Counseled to call the clinic or go to the emergency department/call 911 in the event of worsening symptoms or any other red flag signs/symptoms as explained to the patient in detail    I have reviewed the History and Physical on file for Isidoro Martinez and there is no significant change noted.  Procedure, including risks, discussed with the patient in detail and all questions were answered when I met the patient in the preprocedure area.  Consent was signed as per institutional protocol.  At this time, patient okay to proceed with planned bronchoscopy procedure.    The risks of bronchoscopy, including peripheral bronchoscopy, and endobronchial ultrasound-guided biopsies +/- endobronchial and transbronchial biopsies  +/-  fiducial marker placement were discussed with the patient in detail, including the alternatives to peripheral bronchoscopy and EBUS bronchoscopy.  Risks discussed included, but not limited to, bleeding, infection, pneumothorax, worsening shortness of breath and respiratory failure.  The patient/ their family verbalized understanding of this risk and agreed to proceed with the procedure as above. All questions were answered.     Rapid on-site evaluation (CATY) may be used during the case above, which may provide a preliminary diagnosis.  I have offered to share this preliminary diagnosis with the patient/their family with their understanding that these results are preliminary and the final results may change.  The patient's/their family have opted to be made aware of these preliminary results.       Final pathology results will be subjected to a delayed release in the electronic medical record to the patient as per their request, so  that the results can be interpreted by their physicians and explained to them in a satisfactory manner.  The patient/their family is aware and have agreed to this.        Follow-up   Follow-up  after procedure as above    Edgar Saleem MD  Interventional Pulmonary and Critical Care  [facility]      Problem List Items Addressed This Visit    None          Past Medical History:   Diagnosis Date    Encounter for chest tube removal 2024    Hypertension       Past Surgical History:   Procedure Laterality Date    COLONOSCOPY      HERNIA REPAIR      x2     Family History   Problem Relation Name Age of Onset    Diabetes Mother      Stroke Father      Heart attack Father      Diabetes Sister      Diabetes Brother       Review of patient's allergies indicates:  No Known Allergies   Social History     Tobacco Use    Smoking status: Former     Current packs/day: 0.00     Types: Cigarettes     Quit date: 2024     Years since quittin.0    Smokeless tobacco: Never   Substance Use Topics    Alcohol use: Yes     Alcohol/week: 42.0 standard drinks of alcohol     Types: 42 Cans of beer per week     Comment: socially    Drug use: Never      Review of Systems       Objective:      Physical Exam  Constitutional:       General: He is not in acute distress.     Appearance: Normal appearance. He is normal weight. He is not ill-appearing or diaphoretic.   HENT:      Head: Normocephalic and atraumatic.      Nose: No congestion or rhinorrhea.      Mouth/Throat:      Mouth: Mucous membranes are moist.   Cardiovascular:      Rate and Rhythm: Normal rate and regular rhythm.      Pulses: Normal pulses.      Heart sounds: Normal heart sounds.   Pulmonary:      Effort: Pulmonary effort is normal. No respiratory distress.      Breath sounds: Normal breath sounds. No stridor. No wheezing, rhonchi or rales.   Chest:      Chest wall: No tenderness.   Abdominal:      General: Abdomen is flat.   Musculoskeletal:      Cervical  "back: Normal range of motion. No rigidity.      Right lower leg: No edema.      Left lower leg: No edema.   Skin:     General: Skin is warm.      Findings: No erythema.   Neurological:      General: No focal deficit present.      Mental Status: He is alert and oriented to person, place, and time. Mental status is at baseline.   Psychiatric:         Mood and Affect: Mood normal.         Behavior: Behavior normal.         Thought Content: Thought content normal.                1/3/2025    10:21 AM 12/23/2024     7:36 AM 12/23/2024     7:31 AM 12/23/2024     4:06 AM 12/23/2024    12:30 AM 12/22/2024    11:35 PM 12/22/2024     7:13 PM   Pulmonary Function Tests   SpO2 97 % 96 % 97 % 96 % 95 % 96 % 99 %   Height 5' 8" (1.727 m)         Weight 73.5 kg (162 lb)         BMI (Calculated) 24.6               Outpatient Encounter Medications as of 1/3/2025   Medication Sig Dispense Refill    amLODIPine (NORVASC) 10 MG tablet Take 10 mg by mouth once daily.      HYDROcodone-acetaminophen (NORCO) 5-325 mg per tablet Take 1 tablet by mouth every 6 (six) hours as needed for pain.... May cause drowsiness 12 tablet 0    meloxicam (MOBIC) 15 MG tablet TAKE 1 TABLET(15 MG) BY MOUTH EVERY DAY 90 tablet 3    Lactobacillus acidophilus 500 million cell Cap Take 1 capsule by mouth 3 (three) times daily with meals for 14 days (Patient not taking: Reported on 1/3/2025) 42 capsule 0     No facility-administered encounter medications on file as of 1/3/2025.       Assessment & Plan    As above                                          No orders of the defined types were placed in this encounter.                  "

## 2025-01-03 NOTE — H&P (VIEW-ONLY)
Patient Name: Isidoro Martinez   Primary Care Provider: Gavino Miranda MD   Date of Service: 01/03/2025   Reason for Referral:  Pulmonary adenocarcinoma    Chief Complaint: Hospital Follow Up (Hospital/PET follow up.)      Subjective:      Isidoro Martinez is 69 y.o. male with 69-year-old gentleman with past medical history significant for hypertension who presented to the hospital on 12/1924 in the setting of shortness of breath, cough which was productive.      I personally reviewed the patient's imaging (chest x-ray which showed complete opacification of left hemithorax.  There were some evidence of air bronchograms.  He also had a CT chest with contrast performed in the evening of 12/18/24 which showed evidence of a large left-sided pleural effusion with the associated compressive atelectasis along with mediastinal shift towards the contralateral side.  The patient then underwent left-sided chest tube placement by me, with pleural fluid cytology indicative of pulmonary adenocarcinoma    Initial clinic visit 1/3/25  And then reviewed the patient's PET-CT scan that I had done on 12/27/24 which showed evidence of a subcentimeter left upper lobe pulmonary nodule with left-sided atelectasis/consolidation and some increased activity without evidence of FDG avid mediastinal adenopathy or distant metastases per the official report as well.     Final Diagnosis   Left pleural fluid, thoracentesis:  Adenocarcinoma (see comment)   Electronically signed by Darío Wilson III, MD on 12/23/2024 at 1442   Specimen A General Categorization POSITIVE   Specimen A Intepretation   Positive for malignant cells   Electronically signed by Darío Wilson III, MD on 12/23/2024 at 1449   Comment    The pleural fluid includes numerous atypical  cells occurring singly and in clusters.  The atypical cells have moderately large vesicular nuclei with prominent nucleoli and moderate amounts of cytoplasm.  A panel of  immunohistochemical stains on the cell block demonstrates positive staining for TTF 1, Napsin A and cytokeratin 7.  There is negative staining for p40, cytokeratin 5/6, cytokeratin 20, calretinin, synaptophysin and chromogranin.  These results support the diagnosis of adenocarcinoma of pulmonary origin.  This case was reviewed with Dr. Frazier who concurs with the interpretation.       IMPRESSION:    0.8 cm left upper lobe FDG avid pulmonary nodule may reflect primary  lung neoplasm.    There remains left-sided atelectasis/consolidation and pleural fluid  along the left lung base and periphery of the left lung base. This  shows FDG activity similar to that of the pulmonary nodule described  above and may be reflective of a malignant effusion although  atelectatic lung could also have increased FDG activity.    No FDG avid mediastinal adenopathy, right lung lesion, or activity  elsewhere.      Assessment and Plan      Pulmonary adenocarcinoma  Left-sided pleural effusion, improving   Left upper lobe lung nodule      Assessment:  Based on current available evidence, the patient has a pulmonary adenocarcinoma, stage IV based on presence in patient's pleural fluid without evidence of distant metastases based on PET-CT scan but pending EBUS mediastinal staging and MRI brain.  Most likely source is the FDG avid left upper lobe lung nodule but given the small size of the nodule and the fact that the disease is at stage IV, there is concern that there may be an underlying lesion in the left lower lobe, although atelectasis can also have a similar appearance.  He is now a candidate for navigation bronchoscopy with attempt to sample left upper lobe lung nodule and/or left lower lobe FDG avid area.    Plan  Scheduled for navigational bronchoscopy at next available date with planning CT the day prior.  We will plan for left upper lobe lung nodule plus-minus left lower lobe FDG avid area along with EBUS mediastinal staging  plus-minus placement of fiducial marker  Ordered:Complete PFTs with pre and post bronchodilator testing and 6 minute walk test (pulmonary stress test)    I have referred him over to Radiation Oncology and On[facility] at [facility] and communicated the above findings with them   Counseled to call the clinic or go to the emergency department/call 911 in the event of worsening symptoms or any other red flag signs/symptoms as explained to the patient in detail    I have reviewed the History and Physical on file for Isidoro Martinez and there is no significant change noted.  Procedure, including risks, discussed with the patient in detail and all questions were answered when I met the patient in the preprocedure area.  Consent was signed as per institutional protocol.  At this time, patient okay to proceed with planned bronchoscopy procedure.    The risks of bronchoscopy, including peripheral bronchoscopy, and endobronchial ultrasound-guided biopsies +/- endobronchial and transbronchial biopsies  +/-  fiducial marker placement were discussed with the patient in detail, including the alternatives to peripheral bronchoscopy and EBUS bronchoscopy.  Risks discussed included, but not limited to, bleeding, infection, pneumothorax, worsening shortness of breath and respiratory failure.  The patient/ their family verbalized understanding of this risk and agreed to proceed with the procedure as above. All questions were answered.     Rapid on-site evaluation (CATY) may be used during the case above, which may provide a preliminary diagnosis.  I have offered to share this preliminary diagnosis with the patient/their family with their understanding that these results are preliminary and the final results may change.  The patient's/their family have opted to be made aware of these preliminary results.       Final pathology results will be subjected to a delayed release in the electronic medical record to the patient as per their request, so  that the results can be interpreted by their physicians and explained to them in a satisfactory manner.  The patient/their family is aware and have agreed to this.        Follow-up   Follow-up  after procedure as above    Edgar Saleem MD  Interventional Pulmonary and Critical Care  [facility]      Problem List Items Addressed This Visit    None          Past Medical History:   Diagnosis Date    Encounter for chest tube removal 2024    Hypertension       Past Surgical History:   Procedure Laterality Date    COLONOSCOPY      HERNIA REPAIR      x2     Family History   Problem Relation Name Age of Onset    Diabetes Mother      Stroke Father      Heart attack Father      Diabetes Sister      Diabetes Brother       Review of patient's allergies indicates:  No Known Allergies   Social History     Tobacco Use    Smoking status: Former     Current packs/day: 0.00     Types: Cigarettes     Quit date: 2024     Years since quittin.0    Smokeless tobacco: Never   Substance Use Topics    Alcohol use: Yes     Alcohol/week: 42.0 standard drinks of alcohol     Types: 42 Cans of beer per week     Comment: socially    Drug use: Never      Review of Systems       Objective:      Physical Exam  Constitutional:       General: He is not in acute distress.     Appearance: Normal appearance. He is normal weight. He is not ill-appearing or diaphoretic.   HENT:      Head: Normocephalic and atraumatic.      Nose: No congestion or rhinorrhea.      Mouth/Throat:      Mouth: Mucous membranes are moist.   Cardiovascular:      Rate and Rhythm: Normal rate and regular rhythm.      Pulses: Normal pulses.      Heart sounds: Normal heart sounds.   Pulmonary:      Effort: Pulmonary effort is normal. No respiratory distress.      Breath sounds: Normal breath sounds. No stridor. No wheezing, rhonchi or rales.   Chest:      Chest wall: No tenderness.   Abdominal:      General: Abdomen is flat.   Musculoskeletal:      Cervical  "back: Normal range of motion. No rigidity.      Right lower leg: No edema.      Left lower leg: No edema.   Skin:     General: Skin is warm.      Findings: No erythema.   Neurological:      General: No focal deficit present.      Mental Status: He is alert and oriented to person, place, and time. Mental status is at baseline.   Psychiatric:         Mood and Affect: Mood normal.         Behavior: Behavior normal.         Thought Content: Thought content normal.                1/3/2025    10:21 AM 12/23/2024     7:36 AM 12/23/2024     7:31 AM 12/23/2024     4:06 AM 12/23/2024    12:30 AM 12/22/2024    11:35 PM 12/22/2024     7:13 PM   Pulmonary Function Tests   SpO2 97 % 96 % 97 % 96 % 95 % 96 % 99 %   Height 5' 8" (1.727 m)         Weight 73.5 kg (162 lb)         BMI (Calculated) 24.6               Outpatient Encounter Medications as of 1/3/2025   Medication Sig Dispense Refill    amLODIPine (NORVASC) 10 MG tablet Take 10 mg by mouth once daily.      HYDROcodone-acetaminophen (NORCO) 5-325 mg per tablet Take 1 tablet by mouth every 6 (six) hours as needed for pain.... May cause drowsiness 12 tablet 0    meloxicam (MOBIC) 15 MG tablet TAKE 1 TABLET(15 MG) BY MOUTH EVERY DAY 90 tablet 3    Lactobacillus acidophilus 500 million cell Cap Take 1 capsule by mouth 3 (three) times daily with meals for 14 days (Patient not taking: Reported on 1/3/2025) 42 capsule 0     No facility-administered encounter medications on file as of 1/3/2025.       Assessment & Plan    As above                                          No orders of the defined types were placed in this encounter.                  "

## 2025-01-06 ENCOUNTER — OFFICE VISIT (OUTPATIENT)
Dept: INTERNAL MEDICINE | Facility: CLINIC | Age: 70
End: 2025-01-06
Payer: MEDICARE

## 2025-01-06 VITALS
OXYGEN SATURATION: 96 % | BODY MASS INDEX: 24.71 KG/M2 | DIASTOLIC BLOOD PRESSURE: 78 MMHG | HEART RATE: 113 BPM | HEIGHT: 68 IN | TEMPERATURE: 99 F | SYSTOLIC BLOOD PRESSURE: 114 MMHG | RESPIRATION RATE: 16 BRPM | WEIGHT: 163 LBS

## 2025-01-06 DIAGNOSIS — M15.9 OSTEOARTHRITIS OF MULTIPLE JOINTS, UNSPECIFIED OSTEOARTHRITIS TYPE: ICD-10-CM

## 2025-01-06 DIAGNOSIS — Z72.0 TOBACCO ABUSE: ICD-10-CM

## 2025-01-06 DIAGNOSIS — I10 ESSENTIAL HYPERTENSION: Primary | ICD-10-CM

## 2025-01-06 DIAGNOSIS — J30.2 SEASONAL ALLERGIES: ICD-10-CM

## 2025-01-06 PROCEDURE — 1160F RVW MEDS BY RX/DR IN RCRD: CPT | Mod: CPTII,,, | Performed by: INTERNAL MEDICINE

## 2025-01-06 PROCEDURE — 99214 OFFICE O/P EST MOD 30 MIN: CPT | Mod: PBBFAC | Performed by: INTERNAL MEDICINE

## 2025-01-06 PROCEDURE — 1111F DSCHRG MED/CURRENT MED MERGE: CPT | Mod: CPTII,,, | Performed by: INTERNAL MEDICINE

## 2025-01-06 PROCEDURE — 3074F SYST BP LT 130 MM HG: CPT | Mod: CPTII,,, | Performed by: INTERNAL MEDICINE

## 2025-01-06 PROCEDURE — 3288F FALL RISK ASSESSMENT DOCD: CPT | Mod: CPTII,,, | Performed by: INTERNAL MEDICINE

## 2025-01-06 PROCEDURE — 3078F DIAST BP <80 MM HG: CPT | Mod: CPTII,,, | Performed by: INTERNAL MEDICINE

## 2025-01-06 PROCEDURE — 1159F MED LIST DOCD IN RCRD: CPT | Mod: CPTII,,, | Performed by: INTERNAL MEDICINE

## 2025-01-06 PROCEDURE — 3008F BODY MASS INDEX DOCD: CPT | Mod: CPTII,,, | Performed by: INTERNAL MEDICINE

## 2025-01-06 PROCEDURE — 1101F PT FALLS ASSESS-DOCD LE1/YR: CPT | Mod: CPTII,,, | Performed by: INTERNAL MEDICINE

## 2025-01-06 PROCEDURE — 99999 PR PBB SHADOW E&M-EST. PATIENT-LVL IV: CPT | Mod: PBBFAC,,, | Performed by: INTERNAL MEDICINE

## 2025-01-06 PROCEDURE — 99214 OFFICE O/P EST MOD 30 MIN: CPT | Mod: S$PBB,,, | Performed by: INTERNAL MEDICINE

## 2025-01-06 RX ORDER — LORATADINE 10 MG/1
10 TABLET ORAL DAILY
COMMUNITY

## 2025-01-06 RX ORDER — ASPIRIN 81 MG/1
81 TABLET ORAL DAILY
COMMUNITY

## 2025-01-06 NOTE — PROGRESS NOTES
Subjective:       Patient ID: Isidoro Martinez is a 69 y.o. male.    Chief Complaint: Transitional Care (Needs psa)    Doing better recient hospitaslization for pneumonia  to have lung mri  soon  no smoking for 12 days so far  Review of Systems   Constitutional:  Negative for appetite change, fatigue and unexpected weight change.   HENT:  Negative for postnasal drip, trouble swallowing and goiter.    Eyes:  Negative for visual disturbance.   Respiratory:  Negative for apnea, choking and chest tightness.    Cardiovascular:  Negative for chest pain and leg swelling.   Gastrointestinal:  Negative for blood in stool and change in bowel habit.   Genitourinary:  Negative for difficulty urinating and frequency.   Musculoskeletal:  Negative for arthralgias, back pain and leg pain.   Integumentary:  Negative for rash.   Neurological:  Negative for memory loss and coordination difficulties.   Hematological:  Negative for adenopathy.   Psychiatric/Behavioral:  Negative for agitation. The patient is not hyperactive.        Objective:      Physical Exam  Vitals and nursing note reviewed.   Constitutional:       Appearance: Normal appearance. He is obese.   HENT:      Head: Normocephalic and atraumatic.      Right Ear: Tympanic membrane, ear canal and external ear normal.      Left Ear: Tympanic membrane, ear canal and external ear normal.      Nose: Nose normal.      Mouth/Throat:      Mouth: Mucous membranes are moist.      Pharynx: Oropharynx is clear.   Eyes:      Extraocular Movements: Extraocular movements intact.      Conjunctiva/sclera: Conjunctivae normal.      Pupils: Pupils are equal, round, and reactive to light.   Cardiovascular:      Rate and Rhythm: Normal rate and regular rhythm.      Pulses: Normal pulses.      Heart sounds: Normal heart sounds.   Pulmonary:      Effort: Pulmonary effort is normal.      Breath sounds: Normal breath sounds.   Abdominal:      General: Abdomen is flat. Bowel sounds are normal.       Palpations: Abdomen is soft.   Musculoskeletal:         General: Normal range of motion.      Cervical back: Normal range of motion.   Skin:     General: Skin is warm and dry.      Capillary Refill: Capillary refill takes less than 2 seconds.   Neurological:      General: No focal deficit present.      Mental Status: He is alert and oriented to person, place, and time.   Psychiatric:         Mood and Affect: Mood normal.         Behavior: Behavior normal.         Thought Content: Thought content normal.         Judgment: Judgment normal.       Assessment:       1. Essential hypertension    2. Osteoarthritis of multiple joints, unspecified osteoarthritis type    3. Seasonal allergies    4. Tobacco abuse        Plan:         There are no Patient Instructions on file for this visit.      Problem List Items Addressed This Visit          Other    Tobacco abuse     Other Visit Diagnoses       Essential hypertension    -  Primary    Osteoarthritis of multiple joints, unspecified osteoarthritis type        Seasonal allergies

## 2025-01-16 ENCOUNTER — TELEPHONE (OUTPATIENT)
Dept: GASTROENTEROLOGY | Facility: HOSPITAL | Age: 70
End: 2025-01-16
Payer: MEDICARE

## 2025-01-16 NOTE — TELEPHONE ENCOUNTER
Spoke with patient concerning upcoming Bronchoscopy procedure scheduled for 01.22.25. Patient confirmed appt time of 1000 and arrival time of 0830. Patient confirmed address of facility. Patient expressed understanding of all pre-procedure instructions. Patient confirmed NPO status after midnight on 01.21.25. Pt denied having any cardiac implanted devices, pacemakers, or neuro-stimulators. Patient confirmed having a  present for procedure. Discussed a tentative schedule of times for the day of the procedure. Answered all questions and concerns at this time.

## 2025-01-17 ENCOUNTER — HOSPITAL ENCOUNTER (OUTPATIENT)
Dept: RADIOLOGY | Facility: HOSPITAL | Age: 70
Discharge: HOME | End: 2025-01-17
Attending: STUDENT IN AN ORGANIZED HEALTH CARE EDUCATION/TRAINING PROGRAM
Payer: MEDICARE

## 2025-01-17 DIAGNOSIS — C34.90 MALIGNANT NEOPLASM OF UNSPECIFIED PART OF UNSPECIFIED BRONCHUS OR LUNG: ICD-10-CM

## 2025-01-17 PROCEDURE — 70551 MRI BRAIN STEM W/O DYE: CPT | Mod: 26,,, | Performed by: RADIOLOGY

## 2025-01-17 PROCEDURE — 70551 MRI BRAIN STEM W/O DYE: CPT | Mod: TC

## 2025-01-21 ENCOUNTER — HOSPITAL ENCOUNTER (OUTPATIENT)
Dept: RADIOLOGY | Facility: HOSPITAL | Age: 70
Discharge: HOME | End: 2025-01-21
Attending: STUDENT IN AN ORGANIZED HEALTH CARE EDUCATION/TRAINING PROGRAM
Payer: MEDICARE

## 2025-01-21 DIAGNOSIS — R91.1 SOLITARY PULMONARY NODULE: ICD-10-CM

## 2025-01-21 PROCEDURE — 71250 CT THORAX DX C-: CPT | Mod: 26,,, | Performed by: RADIOLOGY

## 2025-01-21 PROCEDURE — 71250 CT THORAX DX C-: CPT | Mod: TC

## 2025-01-22 ENCOUNTER — HOSPITAL ENCOUNTER (OUTPATIENT)
Dept: GASTROENTEROLOGY | Facility: HOSPITAL | Age: 70
Discharge: HOME | End: 2025-01-22
Attending: STUDENT IN AN ORGANIZED HEALTH CARE EDUCATION/TRAINING PROGRAM
Payer: MEDICARE

## 2025-01-22 ENCOUNTER — HOSPITAL ENCOUNTER (OUTPATIENT)
Dept: RADIOLOGY | Facility: HOSPITAL | Age: 70
Discharge: HOME | End: 2025-01-22
Attending: STUDENT IN AN ORGANIZED HEALTH CARE EDUCATION/TRAINING PROGRAM
Payer: MEDICARE

## 2025-01-22 ENCOUNTER — ANESTHESIA EVENT (OUTPATIENT)
Dept: GASTROENTEROLOGY | Facility: HOSPITAL | Age: 70
End: 2025-01-22
Payer: MEDICARE

## 2025-01-22 ENCOUNTER — ANESTHESIA (OUTPATIENT)
Dept: GASTROENTEROLOGY | Facility: HOSPITAL | Age: 70
End: 2025-01-22
Payer: MEDICARE

## 2025-01-22 VITALS
HEART RATE: 91 BPM | OXYGEN SATURATION: 92 % | WEIGHT: 165 LBS | DIASTOLIC BLOOD PRESSURE: 76 MMHG | HEIGHT: 68 IN | RESPIRATION RATE: 16 BRPM | BODY MASS INDEX: 25.01 KG/M2 | SYSTOLIC BLOOD PRESSURE: 111 MMHG | TEMPERATURE: 98 F

## 2025-01-22 VITALS — SYSTOLIC BLOOD PRESSURE: 112 MMHG | HEART RATE: 112 BPM | DIASTOLIC BLOOD PRESSURE: 75 MMHG | OXYGEN SATURATION: 98 %

## 2025-01-22 DIAGNOSIS — C34.92 ADENOCARCINOMA, LUNG, LEFT: ICD-10-CM

## 2025-01-22 LAB
CLARITY FLD: ABNORMAL
COLOR FLD: ABNORMAL
GRANULOCYTES NFR FLD MANUAL: 37 % (ref 0–25)
LYMPHOCYTES NFR FLD MANUAL: 1 %
MACROPHAGES NFR FLD MANUAL: 62 %
RBC # FLD MANUAL: ABNORMAL /CUMM
WBC # FLD MANUAL: 80 /CUMM

## 2025-01-22 PROCEDURE — 27000177 HC AIRWAY, LARYNGEAL MASK: Performed by: NURSE ANESTHETIST, CERTIFIED REGISTERED

## 2025-01-22 PROCEDURE — 31627 NAVIGATIONAL BRONCHOSCOPY: CPT | Mod: ,,, | Performed by: STUDENT IN AN ORGANIZED HEALTH CARE EDUCATION/TRAINING PROGRAM

## 2025-01-22 PROCEDURE — 31624 DX BRONCHOSCOPE/LAVAGE: CPT | Mod: 51,,, | Performed by: STUDENT IN AN ORGANIZED HEALTH CARE EDUCATION/TRAINING PROGRAM

## 2025-01-22 PROCEDURE — 31624 DX BRONCHOSCOPE/LAVAGE: CPT | Performed by: STUDENT IN AN ORGANIZED HEALTH CARE EDUCATION/TRAINING PROGRAM

## 2025-01-22 PROCEDURE — 71045 X-RAY EXAM CHEST 1 VIEW: CPT | Mod: TC

## 2025-01-22 PROCEDURE — 63600175 PHARM REV CODE 636 W HCPCS: Performed by: NURSE ANESTHETIST, CERTIFIED REGISTERED

## 2025-01-22 PROCEDURE — 88112 CYTOPATH CELL ENHANCE TECH: CPT | Mod: TC,MCY | Performed by: STUDENT IN AN ORGANIZED HEALTH CARE EDUCATION/TRAINING PROGRAM

## 2025-01-22 PROCEDURE — 88305 TISSUE EXAM BY PATHOLOGIST: CPT | Mod: 26,,, | Performed by: PATHOLOGY

## 2025-01-22 PROCEDURE — 37000009 HC ANESTHESIA EA ADD 15 MINS

## 2025-01-22 PROCEDURE — 27000716 HC OXISENSOR PROBE, ANY SIZE: Performed by: NURSE ANESTHETIST, CERTIFIED REGISTERED

## 2025-01-22 PROCEDURE — 31654 BRONCH EBUS IVNTJ PERPH LES: CPT | Mod: ,,, | Performed by: STUDENT IN AN ORGANIZED HEALTH CARE EDUCATION/TRAINING PROGRAM

## 2025-01-22 PROCEDURE — 31629 BRONCHOSCOPY/NEEDLE BX EACH: CPT | Mod: 22,,, | Performed by: STUDENT IN AN ORGANIZED HEALTH CARE EDUCATION/TRAINING PROGRAM

## 2025-01-22 PROCEDURE — 25000003 PHARM REV CODE 250: Performed by: NURSE ANESTHETIST, CERTIFIED REGISTERED

## 2025-01-22 PROCEDURE — 88112 CYTOPATH CELL ENHANCE TECH: CPT | Mod: 26,59,, | Performed by: PATHOLOGY

## 2025-01-22 PROCEDURE — 37000008 HC ANESTHESIA 1ST 15 MINUTES

## 2025-01-22 PROCEDURE — 63600175 PHARM REV CODE 636 W HCPCS: Performed by: STUDENT IN AN ORGANIZED HEALTH CARE EDUCATION/TRAINING PROGRAM

## 2025-01-22 PROCEDURE — 27201423 OPTIME MED/SURG SUP & DEVICES STERILE SUPPLY

## 2025-01-22 PROCEDURE — D9220A PRA ANESTHESIA: Mod: CRNA,,, | Performed by: NURSE ANESTHETIST, CERTIFIED REGISTERED

## 2025-01-22 PROCEDURE — 31627 NAVIGATIONAL BRONCHOSCOPY: CPT | Performed by: STUDENT IN AN ORGANIZED HEALTH CARE EDUCATION/TRAINING PROGRAM

## 2025-01-22 PROCEDURE — 27000510 HC BLANKET BAIR HUGGER ANY SIZE: Performed by: NURSE ANESTHETIST, CERTIFIED REGISTERED

## 2025-01-22 PROCEDURE — 88172 CYTP DX EVAL FNA 1ST EA SITE: CPT | Mod: 26,,, | Performed by: PATHOLOGY

## 2025-01-22 PROCEDURE — D9220A PRA ANESTHESIA: Mod: ANES,,, | Performed by: ANESTHESIOLOGY

## 2025-01-22 PROCEDURE — 89050 BODY FLUID CELL COUNT: CPT | Performed by: STUDENT IN AN ORGANIZED HEALTH CARE EDUCATION/TRAINING PROGRAM

## 2025-01-22 PROCEDURE — 88173 CYTOPATH EVAL FNA REPORT: CPT | Mod: 26,,, | Performed by: PATHOLOGY

## 2025-01-22 PROCEDURE — 31654 BRONCH EBUS IVNTJ PERPH LES: CPT | Performed by: STUDENT IN AN ORGANIZED HEALTH CARE EDUCATION/TRAINING PROGRAM

## 2025-01-22 PROCEDURE — 31629 BRONCHOSCOPY/NEEDLE BX EACH: CPT | Mod: 22 | Performed by: STUDENT IN AN ORGANIZED HEALTH CARE EDUCATION/TRAINING PROGRAM

## 2025-01-22 PROCEDURE — 71045 X-RAY EXAM CHEST 1 VIEW: CPT | Mod: 26,,, | Performed by: RADIOLOGY

## 2025-01-22 PROCEDURE — 87205 SMEAR GRAM STAIN: CPT | Performed by: STUDENT IN AN ORGANIZED HEALTH CARE EDUCATION/TRAINING PROGRAM

## 2025-01-22 RX ORDER — METOPROLOL TARTRATE 1 MG/ML
INJECTION, SOLUTION INTRAVENOUS
Status: DISCONTINUED | OUTPATIENT
Start: 2025-01-22 | End: 2025-01-22

## 2025-01-22 RX ORDER — LIDOCAINE HYDROCHLORIDE 20 MG/ML
INJECTION, SOLUTION EPIDURAL; INFILTRATION; INTRACAUDAL; PERINEURAL
Status: DISCONTINUED | OUTPATIENT
Start: 2025-01-22 | End: 2025-01-22

## 2025-01-22 RX ORDER — VECURONIUM BROMIDE 1 MG/ML
INJECTION, POWDER, LYOPHILIZED, FOR SOLUTION INTRAVENOUS
Status: DISCONTINUED | OUTPATIENT
Start: 2025-01-22 | End: 2025-01-22

## 2025-01-22 RX ORDER — GLUCAGON 1 MG
1 KIT INJECTION
Status: DISCONTINUED | OUTPATIENT
Start: 2025-01-22 | End: 2025-01-23 | Stop reason: HOSPADM

## 2025-01-22 RX ORDER — MIDAZOLAM HYDROCHLORIDE 1 MG/ML
INJECTION INTRAMUSCULAR; INTRAVENOUS
Status: DISCONTINUED | OUTPATIENT
Start: 2025-01-22 | End: 2025-01-22

## 2025-01-22 RX ORDER — MORPHINE SULFATE 10 MG/ML
4 INJECTION INTRAMUSCULAR; INTRAVENOUS; SUBCUTANEOUS EVERY 5 MIN PRN
Status: DISCONTINUED | OUTPATIENT
Start: 2025-01-22 | End: 2025-01-23 | Stop reason: HOSPADM

## 2025-01-22 RX ORDER — DIPHENHYDRAMINE HYDROCHLORIDE 50 MG/ML
25 INJECTION INTRAMUSCULAR; INTRAVENOUS EVERY 6 HOURS PRN
Status: DISCONTINUED | OUTPATIENT
Start: 2025-01-22 | End: 2025-01-23 | Stop reason: HOSPADM

## 2025-01-22 RX ORDER — ONDANSETRON HYDROCHLORIDE 2 MG/ML
INJECTION, SOLUTION INTRAVENOUS
Status: DISCONTINUED | OUTPATIENT
Start: 2025-01-22 | End: 2025-01-22

## 2025-01-22 RX ORDER — PROPOFOL 10 MG/ML
VIAL (ML) INTRAVENOUS
Status: DISCONTINUED | OUTPATIENT
Start: 2025-01-22 | End: 2025-01-22

## 2025-01-22 RX ORDER — LIDOCAINE HYDROCHLORIDE 10 MG/ML
INJECTION, SOLUTION INFILTRATION; PERINEURAL
Status: COMPLETED | OUTPATIENT
Start: 2025-01-22 | End: 2025-01-22

## 2025-01-22 RX ORDER — ONDANSETRON HYDROCHLORIDE 2 MG/ML
4 INJECTION, SOLUTION INTRAVENOUS DAILY PRN
Status: DISCONTINUED | OUTPATIENT
Start: 2025-01-22 | End: 2025-01-23 | Stop reason: HOSPADM

## 2025-01-22 RX ORDER — MEPERIDINE HYDROCHLORIDE 25 MG/ML
25 INJECTION INTRAMUSCULAR; INTRAVENOUS; SUBCUTANEOUS ONCE AS NEEDED
Status: DISCONTINUED | OUTPATIENT
Start: 2025-01-22 | End: 2025-01-23 | Stop reason: HOSPADM

## 2025-01-22 RX ORDER — IPRATROPIUM BROMIDE AND ALBUTEROL SULFATE 2.5; .5 MG/3ML; MG/3ML
3 SOLUTION RESPIRATORY (INHALATION) ONCE AS NEEDED
Status: DISCONTINUED | OUTPATIENT
Start: 2025-01-22 | End: 2025-01-23 | Stop reason: HOSPADM

## 2025-01-22 RX ORDER — ROCURONIUM BROMIDE 10 MG/ML
INJECTION, SOLUTION INTRAVENOUS
Status: DISCONTINUED | OUTPATIENT
Start: 2025-01-22 | End: 2025-01-22

## 2025-01-22 RX ORDER — FENTANYL CITRATE 50 UG/ML
INJECTION, SOLUTION INTRAMUSCULAR; INTRAVENOUS
Status: DISCONTINUED | OUTPATIENT
Start: 2025-01-22 | End: 2025-01-22

## 2025-01-22 RX ORDER — HYDROMORPHONE HYDROCHLORIDE 2 MG/ML
0.5 INJECTION, SOLUTION INTRAMUSCULAR; INTRAVENOUS; SUBCUTANEOUS EVERY 5 MIN PRN
Status: DISCONTINUED | OUTPATIENT
Start: 2025-01-22 | End: 2025-01-23 | Stop reason: HOSPADM

## 2025-01-22 RX ADMIN — PROMETHAZINE HYDROCHLORIDE 25 MG: 25 INJECTION INTRAMUSCULAR; INTRAVENOUS at 08:01

## 2025-01-22 RX ADMIN — ONDANSETRON 4 MG: 2 INJECTION INTRAMUSCULAR; INTRAVENOUS at 07:01

## 2025-01-22 RX ADMIN — MIDAZOLAM HYDROCHLORIDE 2 MG: 1 INJECTION, SOLUTION INTRAMUSCULAR; INTRAVENOUS at 07:01

## 2025-01-22 RX ADMIN — SODIUM CHLORIDE: 9 INJECTION, SOLUTION INTRAVENOUS at 09:01

## 2025-01-22 RX ADMIN — ROCURONIUM BROMIDE 10 MG: 10 INJECTION, SOLUTION INTRAVENOUS at 07:01

## 2025-01-22 RX ADMIN — METOPROLOL TARTRATE 1 MG: 1 INJECTION, SOLUTION INTRAVENOUS at 08:01

## 2025-01-22 RX ADMIN — LIDOCAINE HYDROCHLORIDE 70 MG: 20 INJECTION, SOLUTION INTRAVENOUS at 07:01

## 2025-01-22 RX ADMIN — VECURONIUM BROMIDE 3 MG: 10 INJECTION, POWDER, LYOPHILIZED, FOR SOLUTION INTRAVENOUS at 08:01

## 2025-01-22 RX ADMIN — SODIUM CHLORIDE: 9 INJECTION, SOLUTION INTRAVENOUS at 07:01

## 2025-01-22 RX ADMIN — SUGAMMADEX 200 MG: 100 INJECTION, SOLUTION INTRAVENOUS at 09:01

## 2025-01-22 RX ADMIN — PROPOFOL 150 MCG/KG/MIN: 10 INJECTION, EMULSION INTRAVENOUS at 08:01

## 2025-01-22 RX ADMIN — METOPROLOL TARTRATE 2 MG: 1 INJECTION, SOLUTION INTRAVENOUS at 08:01

## 2025-01-22 RX ADMIN — VECURONIUM BROMIDE 2 MG: 10 INJECTION, POWDER, LYOPHILIZED, FOR SOLUTION INTRAVENOUS at 09:01

## 2025-01-22 RX ADMIN — PROPOFOL 50 MCG/KG/MIN: 10 INJECTION, EMULSION INTRAVENOUS at 07:01

## 2025-01-22 RX ADMIN — LIDOCAINE HYDROCHLORIDE 4 ML: 10 INJECTION, SOLUTION INFILTRATION; PERINEURAL at 09:01

## 2025-01-22 RX ADMIN — FENTANYL CITRATE 50 MCG: 50 INJECTION, SOLUTION INTRAMUSCULAR; INTRAVENOUS at 07:01

## 2025-01-22 RX ADMIN — PROPOFOL 170 MG: 10 INJECTION, EMULSION INTRAVENOUS at 07:01

## 2025-01-22 RX ADMIN — ROCURONIUM BROMIDE 40 MG: 10 INJECTION, SOLUTION INTRAVENOUS at 07:01

## 2025-01-22 NOTE — ANESTHESIA POSTPROCEDURE EVALUATION
Anesthesia Post Evaluation    Patient: Isidoro Martinez    Procedure(s) Performed: * No procedures listed *    Final Anesthesia Type: general      Patient location during evaluation: PACU  Patient participation: Yes- Able to Participate  Level of consciousness: awake and alert and oriented  Post-procedure vital signs: reviewed and stable  Pain management: adequate  Airway patency: patent  VAN mitigation strategies: Multimodal analgesia  PONV status at discharge: No PONV  Anesthetic complications: no      Cardiovascular status: hemodynamically stable  Respiratory status: unassisted and spontaneous ventilation  Hydration status: euvolemic  Follow-up not needed.              Vitals Value Taken Time   /74 01/22/25 1046   Temp 36.6 °C (97.9 °F) 01/22/25 1005   Pulse 95 01/22/25 1057   Resp 16 01/22/25 1045   SpO2 91 % 01/22/25 1057   Vitals shown include unfiled device data.      No case tracking events are documented in the log.      Pain/Fe Score: Pain Rating Prior to Med Admin: 0 (1/22/2025  7:11 AM)  Fe Score: 10 (1/22/2025 10:30 AM)

## 2025-01-22 NOTE — OR NURSING
0958 REC'D TO PACU IN STABLE CONDITION. VSS. SATS 98% ON 6L/FM. WILL TITRATE O2 ATOL. PAIN RATED 0 ON FACES SCALE AT THIS TIME. WILL CONT TO MONITOR.    1020 UPDATE ON PT STATUS GIVEN TO DAUGHTER VIA PHONE CALL TO [facility].    1040 RETURNED TO [facility] IN STABLE CONDITION. REPORT GIVEN TO HILARY SOTOMAYOR. /72 HR 97 SATS 93% ON RA. FAMILY AT BEDSIDE.

## 2025-01-22 NOTE — ANESTHESIA PROCEDURE NOTES
LMA    Date/Time: 1/22/2025 9:27 AM    Performed by: Alex Barajas CRNA  Authorized by: Vinny Duong DO    Intubation:     Induction:  Intravenous    Intubated:  Postinduction    Mask Ventilation:  Easy mask    Attempts:  1    Attempted By:  CRNA    Difficult Airway Encountered?: No      Complications:  None    Airway Device:  Supraglottic airway/LMA    Airway Device Size:  4.0    Style/Cuff Inflation:  Cuffed    Endotracheal tube placement: TO SEAL.    Placement Verified By:  Capnometry    Complicating Factors:  None    Findings Post-Intubation:  BS equal bilateral and atraumatic/condition of teeth unchanged  Notes:      SMOOTH, TOLERATED WELL

## 2025-01-22 NOTE — ANESTHESIA PREPROCEDURE EVALUATION
01/22/2025  Isidoro Martinez is a 69 y.o., male.      Pre-op Assessment    I have reviewed the Patient Summary Reports.     I have reviewed the Nursing Notes. I have reviewed the NPO Status.   I have reviewed the Medications.     Review of Systems  Anesthesia Hx:  No problems with previous Anesthesia             Denies Family Hx of Anesthesia complications.    Denies Personal Hx of Anesthesia complications.                    Social:  Non-Smoker, Alcohol Use       Cardiovascular:  Exercise tolerance: good   Hypertension                                          Pulmonary:  Pneumonia       LASHAY mass                 Past Medical History:   Diagnosis Date    Encounter for chest tube removal 12/21/2024    Hypertension      Past Surgical History:   Procedure Laterality Date    COLONOSCOPY      HERNIA REPAIR      x2         Physical Exam  General: Well nourished, Cooperative and Alert    Airway:  Mallampati: II   Mouth Opening: Normal  TM Distance: Normal  Tongue: Normal  Neck ROM: Normal ROM    Dental:  Intact    Chest/Lungs:  Clear to auscultation, Normal Respiratory Rate    Heart:  Rate: Normal  Rhythm: Regular Rhythm        Chemistry        Component Value Date/Time     (L) 12/21/2024 0546    K 3.6 12/21/2024 0546     12/21/2024 0546    CO2 23 12/21/2024 0546    BUN 7 (L) 12/21/2024 0546    CREATININE 0.71 (L) 12/21/2024 0546     12/21/2024 0546        Component Value Date/Time    CALCIUM 8.5 (L) 12/21/2024 0546    ALKPHOS 54 12/21/2024 0546    AST 30 12/21/2024 0546    ALT 22 12/21/2024 0546    BILITOT 0.5 12/21/2024 0546        Lab Results   Component Value Date    WBC 9.94 12/21/2024    HGB 13.8 12/21/2024    HCT 40.9 12/21/2024     (H) 12/21/2024     Results for orders placed or performed during the hospital encounter of 12/18/24   EKG 12-lead    Collection Time: 12/18/24  5:15 PM    Result Value Ref Range    QRS Duration 80 ms    [facility] QTC Calculation 407 ms    Narrative    Test Reason : R06.02,    Vent. Rate : 129 BPM     Atrial Rate :    BPM     P-R Int : 120 ms          QRS Dur :  80 ms      QT Int : 286 ms       P-R-T Axes :  56  50  24 degrees    QTcB Int : 407 ms    Sinus tachycardia  Low QRS voltages in precordial leads  Borderline ECG    Confirmed by Karl Rascon ([id number]) on 12/19/2024 5:13:45 PM    Referred By: AAAREFERRAL SELF           Confirmed By: Karl Rascon         Anesthesia Plan  Type of Anesthesia, risks & benefits discussed:    Anesthesia Type: Gen ETT, Gen Supraglottic Airway  Intra-op Monitoring Plan: Standard ASA Monitors  Post Op Pain Control Plan: multimodal analgesia  Induction:  IV  Airway Plan: Direct, Post-Induction  Informed Consent: Informed consent signed with the Patient and all parties understand the risks and agree with anesthesia plan.  All questions answered.   ASA Score: 2  Day of Surgery Review of History & Physical: H&P Update referred to the surgeon/provider.I have interviewed and examined the patient. I have reviewed the patient's H&P dated: There are no significant changes.     Ready For Surgery From Anesthesia Perspective.     .

## 2025-01-22 NOTE — DISCHARGE INSTRUCTIONS
POST BRONCHOSCOPY DISCHARGE INSTRUCTIONS:  Today you have undergone a procedure called a bronchoscopy with biopsy (also referred to as Bronchoscopy with EBUS and biopsy). You underwent general anesthesia and the medication will be in your body for the following hours up to 24 hours. It is essential that you are accompanied home by a responsible adult and I recommend that they stay with you during this period. You should NOT drive a vehicle, operate any form of machinery, consume alcohol or sign legal documentation during this time. After 24 hours, the effect of sedation should have worn off and you will be able to start normal activities.      DIET: You may begin a normal diet and fluids 2 HOURS following leaving the hospital. This will make sure your swallowing muscles have recovered properly.      MEDICATIONS: You may resume your usual prescriptions.     BIOPSIES AND SPECIMENS: The biopsies that were taken following your procedure have been taken for processing and testing. It may take up to 1 week, and sometimes longer, to get the final result.  We will discuss additional results during your post procedural clinic visit.         SYMPTOMS:  You may have a sore throat after the procedure; this typically resolves in a day.  Because of the biopsies taken, you may experience a low grade fever for <24 hrs and coughing with some blood coming up.  You may cough after surgery. This is normal to clear secretions in your lung that collected during the time  you were asleep in surgery and could not cough on your own. You may also see some blood in your sputum.  This is normal and as long as it is only a small amount there is no need for alarm.      Awareness of the following unusual symptoms should prompt you to call our office at [phone] to discuss a plan for management. If the symptoms come on suddenly go to the Emergency Department for evaluation.  These unusual symptoms include:  Sudden breathing distress, such as  being more breathless than you normally are  Chest pain not responding to medication  Persistently high temperature or fever of 100.4°F or higher  Coughing up large amount of blood or blood clots (more than a teaspoon)   Sudden swelling of your previous IV sites           Edgar Saleem MD  Interventional Pulmonary and Critical Care  [facility]

## 2025-01-22 NOTE — BRIEF OP NOTE
Isidoro Martinez  presents 01/22/2025 in outpatient setting for planned navigation bronchoscopy and EBUS TBNA.    PROCEDURES: Bronchoscopy, Bronchoscopy with Endobronchial ultrasound (EBUS), Fluoroscopic guided transbronchial needle aspiration of lung (TBNA), and Bronchoalveolar lavage and navigation bronchoscopy.      See procedure note for further details.    OUTCOME: Patient tolerated the procedure well without complication and is now ready for discharge    DISPOSITION: Home or self care    FINAL DIAGNOSIS:  Successful navigation bronchoscopy with preliminary analysis indicative of malignancy of left upper lobe lung nodule with final pathology analysis pending.  EBUS mediastinal staging also negative as no lymph nodes met size criteria for sampling.      FOLLOW UP:  Preliminary results shared with patient, final results to be discussed during post procedural visit.    DISCHARGE INSTRUCTIONS: Post-bronchoscopy instructions discussed with patient. Entered into AVS.     TIME SPENT ON DISCHARGE: <30 minutes      Discussed with patient and accompanying family/friend management plans, all questions were answered and they verbalized understanding.       Edgar Saleem MD  Interventional Pulmonary and Critical Care  [facility]

## 2025-01-22 NOTE — INTERVAL H&P NOTE
I have reviewed the History and Physical on file for Isidoro Martinez and there is no significant change noted.  Procedure, including risks, discussed with the patient in detail and all questions were answered when I met the patient in the preprocedure area.  Consent was signed as per institutional protocol.  At this time, patient okay to proceed with planned bronchoscopy procedure.    The risks of bronchoscopy, including peripheral bronchoscopy, and endobronchial ultrasound-guided biopsies +/- endobronchial and transbronchial biopsies  +/-  fiducial marker placement were discussed with the patient in detail, including the alternatives to peripheral bronchoscopy and EBUS bronchoscopy.  Risks discussed included, but not limited to, bleeding, infection, pneumothorax, worsening shortness of breath and respiratory failure.  The patient/ their family verbalized understanding of this risk and agreed to proceed with the procedure as above. Given the patient's location of the left lung nodule, the patient is potentially at higher risk of pneumothorax as compared to a patient with a more central lung nodule.  The patient their family are understanding of this.      Rapid on-site evaluation (CATY) may be used during the case above, which may provide a preliminary diagnosis.  I have offered to share this preliminary diagnosis with the patient/their family with their understanding that these results are preliminary and the final results may change.  The patient's/their family have opted to be made aware of these preliminary results.       Final pathology results will be subjected to a delayed release in the electronic medical record to the patient as per their request, so that the results can be interpreted by their physicians and explained to them in a satisfactory manner.  The patient/their family is aware and have agreed to this.        Edgar Slaeem MD  Interventional Pulmonary and Critical Care  [facility]

## 2025-01-22 NOTE — ANESTHESIA PROCEDURE NOTES
Intubation    Date/Time: 1/22/2025 7:53 AM    Performed by: Alex Barajas CRNA  Authorized by: Vinny Duong DO    Intubation:     Induction:  Intravenous    Intubated:  Postinduction    Mask Ventilation:  Easy mask    Attempts:  1    Attempted By:  CRNA    Method of Intubation:  Direct and bougie    Blade:  Chelsea 3    Laryngeal View Grade: Grade III - only epiglottis visible      Difficult Airway Encountered?: No      Complications:  None    Airway Device:  Oral endotracheal tube    Airway Device Size:  8.0    Style/Cuff Inflation:  Cuffed (inflated to minimal occlusive pressure)    Inflation Amount (mL):  7    Tube secured:  24    Placement Verified By:  Capnometry    Complicating Factors:  Large/floppy epiglottis and poor neck/head extension    Findings Post-Intubation:  BS equal bilateral and atraumatic/condition of teeth unchanged  Notes:      Smooth, tolerated well

## 2025-01-23 LAB
ESTROGEN SERPL-MCNC: NORMAL PG/ML
INSULIN SERPL-ACNC: NORMAL U[IU]/ML
LAB AP CLINICAL INFORMATION: NORMAL
LAB AP COMMENTS: NORMAL
LAB AP GROSS DESCRIPTION: NORMAL
RUSH AP QUICK STAIN DIAGNOSIS: NORMAL
T3RU NFR SERPL: NORMAL %

## 2025-01-24 LAB
CULTURE, LOWER RESPIRATORY: NORMAL
GRAM STN SPEC: NORMAL

## 2025-01-27 ENCOUNTER — CLINICAL SUPPORT (OUTPATIENT)
Dept: PULMONOLOGY | Facility: HOSPITAL | Age: 70
End: 2025-01-27
Attending: INTERNAL MEDICINE
Payer: MEDICARE

## 2025-01-27 ENCOUNTER — CLINICAL SUPPORT (OUTPATIENT)
Dept: PULMONOLOGY | Facility: HOSPITAL | Age: 70
End: 2025-01-27
Attending: STUDENT IN AN ORGANIZED HEALTH CARE EDUCATION/TRAINING PROGRAM
Payer: MEDICARE

## 2025-01-27 VITALS — HEIGHT: 68 IN | WEIGHT: 165 LBS | BODY MASS INDEX: 25.01 KG/M2 | OXYGEN SATURATION: 100 %

## 2025-01-27 DIAGNOSIS — C34.92 ADENOCARCINOMA, LUNG, LEFT: ICD-10-CM

## 2025-01-27 PROCEDURE — 27100098 HC SPACER

## 2025-01-27 PROCEDURE — 94729 DIFFUSING CAPACITY: CPT

## 2025-01-27 PROCEDURE — 94727 GAS DIL/WSHOT DETER LNG VOL: CPT

## 2025-01-27 PROCEDURE — 94060 EVALUATION OF WHEEZING: CPT

## 2025-01-27 PROCEDURE — 94618 PULMONARY STRESS TESTING: CPT

## 2025-01-30 ENCOUNTER — OFFICE VISIT (OUTPATIENT)
Dept: PULMONOLOGY | Facility: CLINIC | Age: 70
End: 2025-01-30
Payer: MEDICARE

## 2025-01-30 DIAGNOSIS — C34.92 ADENOCARCINOMA, LUNG, LEFT: Primary | ICD-10-CM

## 2025-01-30 RX ORDER — ONDANSETRON 4 MG/1
TABLET, FILM COATED ORAL
COMMUNITY
Start: 2025-01-29

## 2025-01-30 NOTE — PROGRESS NOTES
Audio Only Telehealth Visit     The patient location is:  [place]  The chief complaint leading to consultation is:  Follow up from biopsy  Visit type: Virtual visit with audio only (telephone)  Total time spent in medical discussion with patient:  11 minutes        The reason for the audio only service rather than synchronous audio and video virtual visit was related to technical difficulties or patient preference/necessity.       Each patient to whom I provide medical services by telemedicine is:  (1) informed of the relationship between the physician and patient and the respective role of any other health care provider with respect to management of the patient; and (2) notified that they may decline to receive medical services by telemedicine and may withdraw from such care at any time. Patient verbally consented to receive this service via voice-only telephone call.           Isidoro Martinez is 69 y.o. male with 69-year-old gentleman with past medical history significant for hypertension who presented to the hospital on 12/1924 in the setting of shortness of breath, cough which was productive.         Follow up clinic visit 1/30/25   The patient is status post bronchoscopy with navigational bronchoscopy with diagnosis of adenocarcinoma of left upper lobe lung nodule.         Final Diagnosis   A. Lung, left upper lobe nodule, transbronchial needle aspiration:  - Positive for malignant cells, favor adenocarcinoma     B. Lung, bronchoalveolar lavage (BAL):  - Benign epithelial cells, acute inflammation, and pigment-laden macrophages   - No overt malignancy is identified    Electronically signed by Dontrell Frazier MD on 1/23/2025 at 1623   Comment    (A): Due to the fact that the vast majority of tumor that is present is on the aspirate smears and there may not be adequate tumor within the cell block, the specimen will be preemptively sent to the [facility] for molecular testing as they are validated to  perform molecular testing on aspirate smears.     Previous clinic visits as below:  I personally reviewed the patient's imaging (chest x-ray which showed complete opacification of left hemithorax.  There were some evidence of air bronchograms.  He also had a CT chest with contrast performed in the evening of 12/18/24 which showed evidence of a large left-sided pleural effusion with the associated compressive atelectasis along with mediastinal shift towards the contralateral side.  The patient then underwent left-sided chest tube placement by me, with pleural fluid cytology indicative of pulmonary adenocarcinoma     Initial clinic visit 1/3/25  And then reviewed the patient's PET-CT scan that I had done on 12/27/24 which showed evidence of a subcentimeter left upper lobe pulmonary nodule with left-sided atelectasis/consolidation and some increased activity without evidence of FDG avid mediastinal adenopathy or distant metastases per the official report as well.         Final Diagnosis   Left pleural fluid, thoracentesis:  Adenocarcinoma (see comment)   Electronically signed by Darío Wilson III, MD on 12/23/2024 at 1449   Specimen A General Categorization POSITIVE   Specimen A Intepretation   Positive for malignant cells   Electronically signed by Darío Wilson III, MD on 12/23/2024 at 1449   Comment     The pleural fluid includes numerous atypical  cells occurring singly and in clusters.  The atypical cells have moderately large vesicular nuclei with prominent nucleoli and moderate amounts of cytoplasm.  A panel of immunohistochemical stains on the cell block demonstrates positive staining for TTF 1, Napsin A and cytokeratin 7.  There is negative staining for p40, cytokeratin 5/6, cytokeratin 20, calretinin, synaptophysin and chromogranin.  These results support the diagnosis of adenocarcinoma of pulmonary origin.  This case was reviewed with Dr. Frazier who concurs with the interpretation.          IMPRESSION:    0.8 cm left upper lobe FDG avid pulmonary nodule may reflect primary  lung neoplasm.    There remains left-sided atelectasis/consolidation and pleural fluid  along the left lung base and periphery of the left lung base. This  shows FDG activity similar to that of the pulmonary nodule described  above and may be reflective of a malignant effusion although  atelectatic lung could also have increased FDG activity.    No FDG avid mediastinal adenopathy, right lung lesion, or activity  elsewhere.        Assessment and Plan        Pulmonary adenocarcinoma  Left-sided malignant pleural effusion, improving  Left upper lobe adenocarcinoma        Assessment:  Met with Radiation Oncology as well as Medical Oncology with no plans for radiation but to start systemic chemotherapy sometime end of February of 2025.  Have referred patient over to Cardiology for episodes of tachycardia seen perioperatively.  I reviewed his chest x-ray.  We will need a repeat chest x-ray when he returns but otherwise feels well from a respiratory standpoint at this time.  All questions answered.       Plan  Continue to follow up with Radiation Oncology and Oncology   Tissue to be sent over for molecular markers by pathology   Chest x-ray with the patient returns-have discussed the possibility of potential PleurX in the future if systemic chemotherapy is not effective in controlling the patient's malignant pleural effusion.  If he develops any shortness of breath or has any imaging indicative of a moderate-large pleural effusion, we will bring the patient in sooner for PleurX.    Counseled to call the clinic or go to the emergency department/call 911 in the event of worsening symptoms or any other red flag signs/symptoms as explained to the patient in detail      Follow-up   Follow-up as per schedule     Edgar Saleem MD  Interventional Pulmonary and Critical Care  [facility]     None

## 2025-02-11 ENCOUNTER — OFFICE VISIT (OUTPATIENT)
Dept: CARDIOLOGY | Facility: CLINIC | Age: 70
End: 2025-02-11
Payer: MEDICARE

## 2025-02-11 VITALS
OXYGEN SATURATION: 97 % | HEART RATE: 101 BPM | DIASTOLIC BLOOD PRESSURE: 80 MMHG | WEIGHT: 167.38 LBS | HEIGHT: 68 IN | SYSTOLIC BLOOD PRESSURE: 130 MMHG | BODY MASS INDEX: 25.37 KG/M2

## 2025-02-11 DIAGNOSIS — R94.31 ABNORMAL EKG: ICD-10-CM

## 2025-02-11 DIAGNOSIS — C34.92 ADENOCARCINOMA, LUNG, LEFT: ICD-10-CM

## 2025-02-11 DIAGNOSIS — I10 PRIMARY HYPERTENSION: Primary | ICD-10-CM

## 2025-02-11 DIAGNOSIS — R00.0 TACHYCARDIA: ICD-10-CM

## 2025-02-11 DIAGNOSIS — R07.9 CHEST PAIN, UNSPECIFIED TYPE: ICD-10-CM

## 2025-02-11 LAB
OHS QRS DURATION: 78 MS
OHS QTC CALCULATION: 420 MS

## 2025-02-11 PROCEDURE — 3008F BODY MASS INDEX DOCD: CPT | Mod: CPTII,,, | Performed by: STUDENT IN AN ORGANIZED HEALTH CARE EDUCATION/TRAINING PROGRAM

## 2025-02-11 PROCEDURE — 3075F SYST BP GE 130 - 139MM HG: CPT | Mod: CPTII,,, | Performed by: STUDENT IN AN ORGANIZED HEALTH CARE EDUCATION/TRAINING PROGRAM

## 2025-02-11 PROCEDURE — 3079F DIAST BP 80-89 MM HG: CPT | Mod: CPTII,,, | Performed by: STUDENT IN AN ORGANIZED HEALTH CARE EDUCATION/TRAINING PROGRAM

## 2025-02-11 PROCEDURE — 99999 PR PBB SHADOW E&M-EST. PATIENT-LVL V: CPT | Mod: PBBFAC,,, | Performed by: STUDENT IN AN ORGANIZED HEALTH CARE EDUCATION/TRAINING PROGRAM

## 2025-02-11 PROCEDURE — 93010 ELECTROCARDIOGRAM REPORT: CPT | Mod: S$PBB,,, | Performed by: STUDENT IN AN ORGANIZED HEALTH CARE EDUCATION/TRAINING PROGRAM

## 2025-02-11 PROCEDURE — 1126F AMNT PAIN NOTED NONE PRSNT: CPT | Mod: CPTII,,, | Performed by: STUDENT IN AN ORGANIZED HEALTH CARE EDUCATION/TRAINING PROGRAM

## 2025-02-11 PROCEDURE — 1159F MED LIST DOCD IN RCRD: CPT | Mod: CPTII,,, | Performed by: STUDENT IN AN ORGANIZED HEALTH CARE EDUCATION/TRAINING PROGRAM

## 2025-02-11 PROCEDURE — 99204 OFFICE O/P NEW MOD 45 MIN: CPT | Mod: S$PBB,,, | Performed by: STUDENT IN AN ORGANIZED HEALTH CARE EDUCATION/TRAINING PROGRAM

## 2025-02-11 PROCEDURE — 93005 ELECTROCARDIOGRAM TRACING: CPT | Mod: PBBFAC | Performed by: STUDENT IN AN ORGANIZED HEALTH CARE EDUCATION/TRAINING PROGRAM

## 2025-02-11 PROCEDURE — 99215 OFFICE O/P EST HI 40 MIN: CPT | Mod: PBBFAC,25 | Performed by: STUDENT IN AN ORGANIZED HEALTH CARE EDUCATION/TRAINING PROGRAM

## 2025-02-11 RX ORDER — FOLIC ACID 1 MG/1
1000 TABLET ORAL
COMMUNITY
Start: 2025-01-29

## 2025-02-11 NOTE — PROGRESS NOTES
"PCP: Gavino Miranda MD    Referring Provider: Edgar Saleem MD  [address] [place] [id number]    Reason for referral: Sinus tachycardia    Subjective:   Isidoro Martinez is a 69 y.o. male former smoker with hx of HTN  and recent diagnosis of lung adenocarcinoma, who presents for a new patient visit.     Referred for evaluation of tachycardia. He notes a 2 month hx of intermittent chest pain/pressure. Chest pain is usually 1-2 times/week. Can be w/ or w/ exertion Denies shortness of breath, lightheadedness or syncope. EKG today sinus tachycardia, T wave abnormality suggestive of inferior ischemia.    Fhx: Father (CAD)  Shx: Former smoker. Recently quit smoking 12/2023.     EKG 2/11/25: Sinus tachycardia, T wave abnormality, consider inferior ischemia    ECHO No results found for this or any previous visit.     CATH: No results found for this or any previous visit.     Lab Results   Component Value Date     (L) 12/21/2024    K 3.6 12/21/2024     12/21/2024    CO2 23 12/21/2024    BUN 7 (L) 12/21/2024    CREATININE 0.71 (L) 12/21/2024    CALCIUM 8.5 (L) 12/21/2024    ANIONGAP 13 12/21/2024       No results found for: "CHOL"  No results found for: "HDL"  No results found for: "LDLCALC"  No results found for: "TRIG"  No results found for: "CHOLHDL"    Lab Results   Component Value Date    WBC 9.94 12/21/2024    HGB 13.8 12/21/2024    HCT 40.9 12/21/2024    MCV 89.7 12/21/2024     (H) 12/21/2024           Current Outpatient Medications:     amLODIPine (NORVASC) 10 MG tablet, Take 10 mg by mouth once daily., Disp: , Rfl:     aspirin (ECOTRIN) 81 MG EC tablet, Take 81 mg by mouth once daily., Disp: , Rfl:     folic acid (FOLVITE) 1 MG tablet, Take 1,000 mcg by mouth., Disp: , Rfl:     loratadine (CLARITIN) 10 mg tablet, Take 10 mg by mouth once daily., Disp: , Rfl:     meloxicam (MOBIC) 15 MG tablet, TAKE 1 TABLET(15 MG) BY MOUTH EVERY DAY, Disp: 90 tablet, Rfl: 3    ondansetron (ZOFRAN) 4 MG " "tablet, , Disp: , Rfl:     HYDROcodone-acetaminophen (NORCO) 5-325 mg per tablet, Take 1 tablet by mouth every 6 (six) hours as needed for pain.... May cause drowsiness (Patient not taking: Reported on 2/11/2025), Disp: 12 tablet, Rfl: 0    Review of Systems   Constitutional:  Negative for chills, diaphoresis, fever and malaise/fatigue.   Respiratory:  Negative for cough and shortness of breath.    Cardiovascular:  Negative for chest pain, palpitations, orthopnea, claudication, leg swelling and PND.   Gastrointestinal:  Negative for abdominal pain, heartburn, nausea and vomiting.   Neurological:  Negative for dizziness.       Objective:   /80 (BP Location: Left arm, Patient Position: Sitting)   Pulse 101   Ht 5' 8" (1.727 m)   Wt 75.9 kg (167 lb 6.4 oz)   SpO2 97%   BMI 25.45 kg/m²     Physical Exam  Constitutional:       General: He is not in acute distress.     Appearance: Normal appearance.   Cardiovascular:      Rate and Rhythm: Normal rate and regular rhythm.      Pulses: Normal pulses.      Heart sounds: Normal heart sounds. No murmur heard.     No friction rub. No gallop.   Pulmonary:      Effort: Pulmonary effort is normal.      Breath sounds: Normal breath sounds. No wheezing or rales.   Musculoskeletal:      Right lower leg: No edema.      Left lower leg: No edema.   Skin:     General: Skin is warm and dry.   Neurological:      Mental Status: He is alert.           Assessment:     1. Primary hypertension  EKG 12-lead    EKG 12-lead      2. Adenocarcinoma, lung, left  Ambulatory referral/consult to Cardiology      3. Chest pain, unspecified type  NM Myocardial Perfusion Spect Multi Pharmacologic    Nuclear Stress Test    Echo      4. Tachycardia  Echo    Holter monitor - 48 hour      5. Abnormal EKG              Plan:   No problem-specific Assessment & Plan notes found for this encounter.    Chest pain  Not associated with exertion  Risk factors: HTN, former smoking, FH  Intermediate pre-test " probability of CAD  Baseline EKG is abnormal w/ inferior T wave abnormality suggestive of ischemia  - Schedule pharmacologic stress test with nuclear imaging  - Schedule echo for evaluation of structural heart disease    Sinus tachycardia  Noted to have sinus tachycardia on EKGs since Dec 2024  Recent diagnosis of lung adenocarcinoma  - Schedule echo for evaluation of structural heart disease  - Schedule 48-hour Holter    Follow-up in 3 months

## 2025-02-24 DIAGNOSIS — M15.9 OSTEOARTHRITIS OF MULTIPLE JOINTS, UNSPECIFIED OSTEOARTHRITIS TYPE: ICD-10-CM

## 2025-02-24 RX ORDER — AMLODIPINE BESYLATE 10 MG/1
10 TABLET ORAL DAILY
Qty: 90 TABLET | Refills: 3 | Status: SHIPPED | OUTPATIENT
Start: 2025-02-24

## 2025-02-24 RX ORDER — MELOXICAM 15 MG/1
15 TABLET ORAL DAILY PRN
Qty: 90 TABLET | Refills: 3 | Status: SHIPPED | OUTPATIENT
Start: 2025-02-24

## 2025-03-03 ENCOUNTER — HOSPITAL ENCOUNTER (OUTPATIENT)
Dept: RADIOLOGY | Facility: HOSPITAL | Age: 70
Discharge: HOME | End: 2025-03-03
Attending: STUDENT IN AN ORGANIZED HEALTH CARE EDUCATION/TRAINING PROGRAM
Payer: MEDICARE

## 2025-03-03 ENCOUNTER — HOSPITAL ENCOUNTER (OUTPATIENT)
Dept: CARDIOLOGY | Facility: HOSPITAL | Age: 70
Discharge: HOME | End: 2025-03-03
Attending: STUDENT IN AN ORGANIZED HEALTH CARE EDUCATION/TRAINING PROGRAM
Payer: MEDICARE

## 2025-03-03 VITALS — SYSTOLIC BLOOD PRESSURE: 141 MMHG | DIASTOLIC BLOOD PRESSURE: 72 MMHG | HEART RATE: 86 BPM

## 2025-03-03 DIAGNOSIS — R07.9 CHEST PAIN, UNSPECIFIED TYPE: ICD-10-CM

## 2025-03-03 DIAGNOSIS — R94.31 ABNORMAL EKG: ICD-10-CM

## 2025-03-03 DIAGNOSIS — R00.0 TACHYCARDIA: ICD-10-CM

## 2025-03-03 PROCEDURE — C8929 TTE W OR WO FOL WCON,DOPPLER: HCPCS

## 2025-03-03 PROCEDURE — 78452 HT MUSCLE IMAGE SPECT MULT: CPT | Mod: 26,,, | Performed by: STUDENT IN AN ORGANIZED HEALTH CARE EDUCATION/TRAINING PROGRAM

## 2025-03-03 PROCEDURE — 93225 XTRNL ECG REC<48 HRS REC: CPT

## 2025-03-03 PROCEDURE — 25500020 PHARM REV CODE 255: Performed by: STUDENT IN AN ORGANIZED HEALTH CARE EDUCATION/TRAINING PROGRAM

## 2025-03-03 PROCEDURE — A9500 TC99M SESTAMIBI: HCPCS | Performed by: STUDENT IN AN ORGANIZED HEALTH CARE EDUCATION/TRAINING PROGRAM

## 2025-03-03 PROCEDURE — 93016 CV STRESS TEST SUPVJ ONLY: CPT | Mod: ,,, | Performed by: STUDENT IN AN ORGANIZED HEALTH CARE EDUCATION/TRAINING PROGRAM

## 2025-03-03 PROCEDURE — 63600175 PHARM REV CODE 636 W HCPCS: Performed by: STUDENT IN AN ORGANIZED HEALTH CARE EDUCATION/TRAINING PROGRAM

## 2025-03-03 PROCEDURE — 93017 CV STRESS TEST TRACING ONLY: CPT

## 2025-03-03 PROCEDURE — 93018 CV STRESS TEST I&R ONLY: CPT | Mod: ,,, | Performed by: STUDENT IN AN ORGANIZED HEALTH CARE EDUCATION/TRAINING PROGRAM

## 2025-03-03 PROCEDURE — 78452 HT MUSCLE IMAGE SPECT MULT: CPT | Mod: TC

## 2025-03-03 PROCEDURE — 93306 TTE W/DOPPLER COMPLETE: CPT | Mod: 26,,, | Performed by: STUDENT IN AN ORGANIZED HEALTH CARE EDUCATION/TRAINING PROGRAM

## 2025-03-03 RX ORDER — REGADENOSON 0.08 MG/ML
0.4 INJECTION, SOLUTION INTRAVENOUS ONCE
Status: COMPLETED | OUTPATIENT
Start: 2025-03-03 | End: 2025-03-03

## 2025-03-03 RX ORDER — TETRAKIS(2-METHOXYISOBUTYLISOCYANIDE)COPPER(I) TETRAFLUOROBORATE 1 MG/ML
12 INJECTION, POWDER, LYOPHILIZED, FOR SOLUTION INTRAVENOUS
Status: COMPLETED | OUTPATIENT
Start: 2025-03-03 | End: 2025-03-03

## 2025-03-03 RX ORDER — TETRAKIS(2-METHOXYISOBUTYLISOCYANIDE)COPPER(I) TETRAFLUOROBORATE 1 MG/ML
35 INJECTION, POWDER, LYOPHILIZED, FOR SOLUTION INTRAVENOUS
Status: COMPLETED | OUTPATIENT
Start: 2025-03-03 | End: 2025-03-03

## 2025-03-03 RX ADMIN — REGADENOSON 0.4 MG: 0.08 INJECTION, SOLUTION INTRAVENOUS at 09:03

## 2025-03-03 RX ADMIN — KIT FOR THE PREPARATION OF TECHNETIUM TC99M SESTAMIBI 35 MILLICURIE: 1 INJECTION, POWDER, LYOPHILIZED, FOR SOLUTION PARENTERAL at 09:03

## 2025-03-03 RX ADMIN — PERFLUTREN 1.5 ML: 6.52 INJECTION, SUSPENSION INTRAVENOUS at 12:03

## 2025-03-03 RX ADMIN — KIT FOR THE PREPARATION OF TECHNETIUM TC99M SESTAMIBI 12 MILLICURIE: 1 INJECTION, POWDER, LYOPHILIZED, FOR SOLUTION PARENTERAL at 08:03

## 2025-03-05 ENCOUNTER — RESULTS FOLLOW-UP (OUTPATIENT)
Dept: CARDIOLOGY | Facility: CLINIC | Age: 70
End: 2025-03-05

## 2025-03-05 ENCOUNTER — TELEPHONE (OUTPATIENT)
Dept: CARDIOLOGY | Facility: CLINIC | Age: 70
End: 2025-03-05
Payer: MEDICARE

## 2025-03-05 DIAGNOSIS — R94.39 ABNORMAL NUCLEAR STRESS TEST: ICD-10-CM

## 2025-03-05 DIAGNOSIS — R07.9 CHEST PAIN, UNSPECIFIED TYPE: Primary | ICD-10-CM

## 2025-03-05 LAB
AORTIC ROOT ANNULUS: 2.53 CM
AORTIC VALVE CUSP SEPERATION: 1.24 CM
AV INDEX (PROSTH): 0.5
AV MEAN GRADIENT: 7 MMHG
AV PEAK GRADIENT: 14 MMHG
AV VALVE AREA BY VELOCITY RATIO: 1.6 CM²
AV VALVE AREA: 1.7 CM²
AV VELOCITY RATIO: 0.47
CV ECHO LV RWT: 0.77 CM
CV STRESS BASE HR: 86 BPM
DIASTOLIC BLOOD PRESSURE: 72 MMHG
DOP CALC AO PEAK VEL: 1.9 M/S
DOP CALC AO VTI: 32.4 CM
DOP CALC LVOT AREA: 3.5 CM2
DOP CALC LVOT DIAMETER: 2.1 CM
DOP CALC LVOT PEAK VEL: 0.9 M/S
DOP CALC LVOT STROKE VOLUME: 56.1 CM3
DOP CALCLVOT PEAK VEL VTI: 16.2 CM
E WAVE DECELERATION TIME: 212 MSEC
E/A RATIO: 0.86
E/E' RATIO: 8 M/S
ECHO LV POSTERIOR WALL: 1.2 CM (ref 0.6–1.1)
FRACTIONAL SHORTENING: 29 % (ref 28–44)
INTERVENTRICULAR SEPTUM: 1.4 CM (ref 0.6–1.1)
IVC DIAMETER: 1.54 CM
LEFT ATRIUM AREA SYSTOLIC (APICAL 2 CHAMBER): 13.35 CM2
LEFT ATRIUM AREA SYSTOLIC (APICAL 4 CHAMBER): 17.78 CM2
LEFT ATRIUM VOLUME MOD: 39 ML
LEFT INTERNAL DIMENSION IN SYSTOLE: 2.2 CM (ref 2.1–4)
LEFT VENTRICLE DIASTOLIC VOLUME: 53 ML
LEFT VENTRICLE END SYSTOLIC VOLUME APICAL 2 CHAMBER: 29.6 ML
LEFT VENTRICLE END SYSTOLIC VOLUME APICAL 4 CHAMBER: 51.44 ML
LEFT VENTRICLE SYSTOLIC VOLUME: 17 ML
LEFT VENTRICULAR INTERNAL DIMENSION IN DIASTOLE: 3.1 CM (ref 3.5–6)
LEFT VENTRICULAR MASS: 129.9 G
LV LATERAL E/E' RATIO: 8.9 M/S
LV SEPTAL E/E' RATIO: 7.8 M/S
LVED V (TEICH): 53.13 ML
LVES V (TEICH): 16.58 ML
LVOT MG: 1.53 MMHG
LVOT MV: 0.57 CM/S
MV PEAK A VEL: 0.72 M/S
MV PEAK E VEL: 0.62 M/S
MV STENOSIS PRESSURE HALF TIME: 61.42 MS
MV VALVE AREA P 1/2 METHOD: 3.58 CM2
OHS CV CPX 1 MINUTE RECOVERY HEART RATE: 110 BPM
OHS CV CPX 85 PERCENT MAX PREDICTED HEART RATE MALE: 129
OHS CV CPX MAX PREDICTED HEART RATE: 152
OHS CV CPX PATIENT IS FEMALE: 0
OHS CV CPX PATIENT IS MALE: 1
OHS CV CPX PEAK DIASTOLIC BLOOD PRESSURE: 72 MMHG
OHS CV CPX PEAK HEAR RATE: 120 BPM
OHS CV CPX PEAK RATE PRESSURE PRODUCT: NORMAL
OHS CV CPX PEAK SYSTOLIC BLOOD PRESSURE: 141 MMHG
OHS CV CPX PERCENT MAX PREDICTED HEART RATE ACHIEVED: 79
OHS CV CPX RATE PRESSURE PRODUCT PRESENTING: NORMAL
OHS CV EVENT MONITOR DAY: 0
OHS CV HOLTER LENGTH DECIMAL HOURS: 48
OHS CV HOLTER LENGTH HOURS: 48
OHS CV HOLTER LENGTH MINUTES: 0
OHS CV HOLTER SINUS AVERAGE HR: 90
OHS CV HOLTER SINUS MAX HR: 132
OHS CV HOLTER SINUS MIN HR: 38
OHS CV RV/LV RATIO: 0.68 CM
PISA TR MAX VEL: 1.9 M/S
PV PEAK GRADIENT: 5 MMHG
PV PEAK VELOCITY: 1.12 M/S
RA PRESSURE ESTIMATED: 3 MMHG
RA VOL SYS: 28.85 ML
RIGHT ATRIAL AREA: 12.7 CM2
RIGHT ATRIUM VOLUME AREA LENGTH APICAL 4 CHAMBER: 27.52 ML
RIGHT VENTRICLE DIASTOLIC BASEL DIMENSION: 2.1 CM
RIGHT VENTRICLE DIASTOLIC LENGTH: 6.8 CM
RIGHT VENTRICLE DIASTOLIC MID DIMENSION: 2.2 CM
RIGHT VENTRICULAR LENGTH IN DIASTOLE (APICAL 4-CHAMBER VIEW): 6.81 CM
RV MID DIAMA: 2.23 CM
RV TB RVSP: 5 MMHG
SYSTOLIC BLOOD PRESSURE: 141 MMHG
TDI LATERAL: 0.07 M/S
TDI SEPTAL: 0.08 M/S
TDI: 0.08 M/S
TRICUSPID ANNULAR PLANE SYSTOLIC EXCURSION: 2.45 CM
TV REST PULMONARY ARTERY PRESSURE: 17 MMHG

## 2025-03-05 NOTE — TELEPHONE ENCOUNTER
Patient was notified echo is normal. Stress test is negative for ischemia however, it shows a high TID ratio (which means that the heart appears larger than normal during stress) which can be a sign of coronary disease in multiple arteries. Hence, recommend coronary CTA for further evaluation. Patient agreeable to testing.

## 2025-03-05 NOTE — PROGRESS NOTES
Please inform echo is normal. Stress test is negative for ischemia however, it shows a high TID ratio (which means that the heart appears larger than normal during stress) which can be a sign of coronary disease in multiple arteries. Hence, recommend coronary CTA for further evaluation. Thanks.

## 2025-04-22 ENCOUNTER — HOSPITAL ENCOUNTER (OUTPATIENT)
Dept: RADIOLOGY | Facility: HOSPITAL | Age: 70
Discharge: HOME | End: 2025-04-22
Attending: STUDENT IN AN ORGANIZED HEALTH CARE EDUCATION/TRAINING PROGRAM
Payer: MEDICARE

## 2025-04-22 ENCOUNTER — OFFICE VISIT (OUTPATIENT)
Dept: PULMONOLOGY | Facility: CLINIC | Age: 70
End: 2025-04-22
Payer: MEDICARE

## 2025-04-22 VITALS
OXYGEN SATURATION: 99 % | SYSTOLIC BLOOD PRESSURE: 112 MMHG | HEART RATE: 85 BPM | WEIGHT: 156 LBS | RESPIRATION RATE: 16 BRPM | BODY MASS INDEX: 23.64 KG/M2 | HEIGHT: 68 IN | DIASTOLIC BLOOD PRESSURE: 82 MMHG

## 2025-04-22 DIAGNOSIS — R06.00 DYSPNEA, UNSPECIFIED TYPE: ICD-10-CM

## 2025-04-22 DIAGNOSIS — J90 PLEURAL EFFUSION, LEFT: ICD-10-CM

## 2025-04-22 DIAGNOSIS — F17.210 NICOTINE DEPENDENCE, CIGARETTES, UNCOMPLICATED: ICD-10-CM

## 2025-04-22 DIAGNOSIS — J90 PLEURAL EFFUSION, LEFT: Primary | ICD-10-CM

## 2025-04-22 DIAGNOSIS — C34.92 ADENOCARCINOMA, LUNG, LEFT: ICD-10-CM

## 2025-04-22 PROCEDURE — 1126F AMNT PAIN NOTED NONE PRSNT: CPT | Mod: CPTII,,, | Performed by: STUDENT IN AN ORGANIZED HEALTH CARE EDUCATION/TRAINING PROGRAM

## 2025-04-22 PROCEDURE — 99999 PR PBB SHADOW E&M-EST. PATIENT-LVL IV: CPT | Mod: PBBFAC,,, | Performed by: STUDENT IN AN ORGANIZED HEALTH CARE EDUCATION/TRAINING PROGRAM

## 2025-04-22 PROCEDURE — 3008F BODY MASS INDEX DOCD: CPT | Mod: CPTII,,, | Performed by: STUDENT IN AN ORGANIZED HEALTH CARE EDUCATION/TRAINING PROGRAM

## 2025-04-22 PROCEDURE — 3074F SYST BP LT 130 MM HG: CPT | Mod: CPTII,,, | Performed by: STUDENT IN AN ORGANIZED HEALTH CARE EDUCATION/TRAINING PROGRAM

## 2025-04-22 PROCEDURE — 3079F DIAST BP 80-89 MM HG: CPT | Mod: CPTII,,, | Performed by: STUDENT IN AN ORGANIZED HEALTH CARE EDUCATION/TRAINING PROGRAM

## 2025-04-22 PROCEDURE — 1101F PT FALLS ASSESS-DOCD LE1/YR: CPT | Mod: CPTII,,, | Performed by: STUDENT IN AN ORGANIZED HEALTH CARE EDUCATION/TRAINING PROGRAM

## 2025-04-22 PROCEDURE — 71046 X-RAY EXAM CHEST 2 VIEWS: CPT | Mod: 26,,, | Performed by: RADIOLOGY

## 2025-04-22 PROCEDURE — 3288F FALL RISK ASSESSMENT DOCD: CPT | Mod: CPTII,,, | Performed by: STUDENT IN AN ORGANIZED HEALTH CARE EDUCATION/TRAINING PROGRAM

## 2025-04-22 PROCEDURE — 71046 X-RAY EXAM CHEST 2 VIEWS: CPT | Mod: TC

## 2025-04-22 PROCEDURE — 1159F MED LIST DOCD IN RCRD: CPT | Mod: CPTII,,, | Performed by: STUDENT IN AN ORGANIZED HEALTH CARE EDUCATION/TRAINING PROGRAM

## 2025-04-22 PROCEDURE — 99214 OFFICE O/P EST MOD 30 MIN: CPT | Mod: S$PBB,,, | Performed by: STUDENT IN AN ORGANIZED HEALTH CARE EDUCATION/TRAINING PROGRAM

## 2025-04-22 PROCEDURE — 99214 OFFICE O/P EST MOD 30 MIN: CPT | Mod: PBBFAC,25 | Performed by: STUDENT IN AN ORGANIZED HEALTH CARE EDUCATION/TRAINING PROGRAM

## 2025-04-22 NOTE — PROGRESS NOTES
Patient Name: Isidoro Martinez   Primary Care Provider: Gavino Miranda MD   Date of Service:  4/22/25  Reason for Referral:  Pulmonary adenocarcinoma    Chief Complaint: Lung Cancer (6wk follow up. Has had four chemo treatments so far. Last one being on Thursday and after this one, he started having some leg weakness a few days after it. Also c/o headaches and sweats. Ongoing for the last week. )      Subjective:      Isidoro Martinez is 69 y.o. male with 69-year-old gentleman with past medical history significant for hypertension who presented to the hospital on 12/1924 in the setting of shortness of breath, cough which was productive.      Follow up clinic visit 4/22/25  Patient presents for a follow-up visit to monitor his ongoing cancer treatment and to review recent imaging results. Patient is currently undergoing treatment with carboplatin for cancer, which he reports tolerating well without side effects. He completed his most recent treatment last Thursday. A chest XR on January 22, 2025, showed a small left-sided pleural effusion. He underwent an echocardiogram, which was normal, and a stress test that was negative for ischemia but showed a high TID ratio, indicating the heart appears larger than normal during stress. He is scheduled for a CT on the 28th of the current month. He had pneumonia in December. He denies any side effects from his cancer treatment or undergoing radiation therapy.  SOCIAL HISTORY:  Smoking: Quit on December 18, 2024. Previously smoked 0.5 pack a day for 40 years. Has smoked a few cigarettes since quitting.        Follow up clinic visit 1/30/25   The patient is status post bronchoscopy with navigational bronchoscopy with diagnosis of adenocarcinoma of left upper lobe lung nodule.         Final Diagnosis   A. Lung, left upper lobe nodule, transbronchial needle aspiration:  - Positive for malignant cells, favor adenocarcinoma     B. Lung, bronchoalveolar lavage (BAL):  - Benign  epithelial cells, acute inflammation, and pigment-laden macrophages   - No overt malignancy is identified    Electronically signed by Dontrell Frazier MD on 1/23/2025 at 6939   Comment    (A): Due to the fact that the vast majority of tumor that is present is on the aspirate smears and there may not be adequate tumor within the cell block, the specimen will be preemptively sent to the [facility] for molecular testing as they are validated to perform molecular testing on aspirate smears.     Previous clinic visits as below:  I personally reviewed the patient's imaging (chest x-ray which showed complete opacification of left hemithorax.  There were some evidence of air bronchograms.  He also had a CT chest with contrast performed in the evening of 12/18/24 which showed evidence of a large left-sided pleural effusion with the associated compressive atelectasis along with mediastinal shift towards the contralateral side.  The patient then underwent left-sided chest tube placement by me, with pleural fluid cytology indicative of pulmonary adenocarcinoma     Initial clinic visit 1/3/25  And then reviewed the patient's PET-CT scan that I had done on 12/27/24 which showed evidence of a subcentimeter left upper lobe pulmonary nodule with left-sided atelectasis/consolidation and some increased activity without evidence of FDG avid mediastinal adenopathy or distant metastases per the official report as well.         Final Diagnosis   Left pleural fluid, thoracentesis:  Adenocarcinoma (see comment)   Electronically signed by Darío Wilson III, MD on 12/23/2024 at 1449   Specimen A General Categorization POSITIVE   Specimen A Intepretation   Positive for malignant cells   Electronically signed by Darío Wilson III, MD on 12/23/2024 at 1443   Comment     The pleural fluid includes numerous atypical  cells occurring singly and in clusters.  The atypical cells have moderately large vesicular nuclei with prominent  nucleoli and moderate amounts of cytoplasm.  A panel of immunohistochemical stains on the cell block demonstrates positive staining for TTF 1, Napsin A and cytokeratin 7.  There is negative staining for p40, cytokeratin 5/6, cytokeratin 20, calretinin, synaptophysin and chromogranin.  These results support the diagnosis of adenocarcinoma of pulmonary origin.  This case was reviewed with Dr. Frazire who concurs with the interpretation.         IMPRESSION:    0.8 cm left upper lobe FDG avid pulmonary nodule may reflect primary  lung neoplasm.    There remains left-sided atelectasis/consolidation and pleural fluid  along the left lung base and periphery of the left lung base. This  shows FDG activity similar to that of the pulmonary nodule described  above and may be reflective of a malignant effusion although  atelectatic lung could also have increased FDG activity.    No FDG avid mediastinal adenopathy, right lung lesion, or activity  elsewhere.        Assessment and Plan        Pulmonary adenocarcinoma  Left-sided malignant pleural effusion, improving  Left upper lobe adenocarcinoma        MPRESSION:  Personally Reviewed chest XR from January 22, 2025, showing small left-sided pleural effusion.  Tolerating carboplatin well.  Echocardiogram normal, stress test negative for ischemia but showed high TID ratio.  Awaiting CT results scheduled for the 28th.  Quit smoking on December 18, 2024, after 40-year history of half pack per day with occasional smoking relapse, but overall progress in smoking cessation is commendable.    NICOTINE DEPENDENCE:  - Patient to continue efforts to quit smoking completely.  Duration of counseling 5 minutes    PLEURAL EFFUSION:  - Ordered chest XR to monitor pleural effusion.  - Contact office after completing CT on the 28th to ensure images are received.    FOLLOW-UP:  - Follow up in about 6 months.     Edgar Saleem MD  Interventional Pulmonary and Critical Care  [facility]    This note was generated with the assistance of ambient listening technology. Verbal consent was obtained by the patient and accompanying visitor(s) for the recording of patient appointment to facilitate this note. I attest to having reviewed and edited the generated note for accuracy, though some syntax or spelling errors may persist. Please contact the author of this note for any clarification.    Problem List Items Addressed This Visit    None  Visit Diagnoses         Pleural effusion, left    -  Primary    Relevant Orders    X-Ray Chest PA And Lateral (Completed)                Past Medical History:   Diagnosis Date    Encounter for chest tube removal 2024    Hypertension       Past Surgical History:   Procedure Laterality Date    COLONOSCOPY      HERNIA REPAIR      x2     Family History   Problem Relation Name Age of Onset    Diabetes Mother      Stroke Father      Heart attack Father      Diabetes Sister      Diabetes Brother       Review of patient's allergies indicates:  No Known Allergies   Social History     Tobacco Use    Smoking status: Former     Current packs/day: 0.00     Types: Cigarettes     Quit date: 2024     Years since quittin.3    Smokeless tobacco: Never    Tobacco comments:     Smoked for 40 years, less than a pack per day   Substance Use Topics    Alcohol use: Yes     Alcohol/week: 42.0 standard drinks of alcohol     Types: 42 Cans of beer per week     Comment: socially    Drug use: Never      Review of Systems       Objective:      Physical Exam  Constitutional:       General: He is not in acute distress.     Appearance: Normal appearance. He is normal weight. He is not ill-appearing or diaphoretic.   HENT:      Head: Normocephalic and atraumatic.      Nose: No congestion or rhinorrhea.      Mouth/Throat:      Mouth: Mucous membranes are moist.   Cardiovascular:      Rate and Rhythm: Normal rate and regular rhythm.      Pulses: Normal pulses.      Heart sounds:  "Normal heart sounds.   Pulmonary:      Effort: Pulmonary effort is normal. No respiratory distress.      Breath sounds: Normal breath sounds. No stridor. No wheezing, rhonchi or rales.   Chest:      Chest wall: No tenderness.   Abdominal:      General: Abdomen is flat.   Musculoskeletal:      Cervical back: Normal range of motion. No rigidity.      Right lower leg: No edema.      Left lower leg: No edema.   Skin:     General: Skin is warm.      Findings: No erythema.   Neurological:      General: No focal deficit present.      Mental Status: He is alert and oriented to person, place, and time. Mental status is at baseline.   Psychiatric:         Mood and Affect: Mood normal.         Behavior: Behavior normal.         Thought Content: Thought content normal.                4/22/2025    10:12 AM 2/11/2025    10:17 AM 1/27/2025     9:00 AM 1/27/2025     8:40 AM 1/22/2025     3:00 PM 1/22/2025     2:45 PM 1/22/2025     2:30 PM   Pulmonary Function Tests   FVC   2.44 liters       FVC%   60       FEV1   1.87 liters       FEV1%   61       FEF 25-75   1.55       FEF 25-75%   64       TLC (liters)   3.72 liters       TLC%   56       RV   1.07       RV%   46       DLCO (ml/mmHg sec)   13.68 ml/mmHg sec       DLCO%   55       Peak Flow   341 L/min       FiO2 (%)   21 %       SpO2 99 % 97 % 100 %  92 % 93 % 93 %   Ordering Provider    Edgar Saleem MD      Performing nurse/tech/RT    Rach Cisneros CRT      Diagnosis    Shortness of Breath      Height 5' 8" (1.727 m) 5' 8" (1.727 m)  5' 8" (1.727 m)      Weight 70.8 kg (156 lb) 75.9 kg (167 lb 6.4 oz)  74.8 kg (165 lb)      BMI (Calculated) 23.7 25.5  25.1      6MWT Status    completed without stopping      Patient Reported    Leg/hip pain      Was O2 used?    No      6MW Distance walked (feet)    1453 feet      Distance walked (meters)    442.87 meters      Did patient stop?    No      Type of assistive device(s) used?    no assistive devices      Oxygen Saturation    100 %    "   Supplemental Oxygen    Room Air      Heart Rate    105 bpm      Blood Pressure    134/84      Jordan Dyspnea Rating     nothing at all      Oxygen Saturation    100 %      Supplemental Oxygen    Room Air      Heart Rate    117 bpm      Blood Pressure    132/78      Jordan Dyspnea Rating     moderate            Outpatient Encounter Medications as of 4/22/2025   Medication Sig Dispense Refill    amLODIPine (NORVASC) 10 MG tablet Take 1 tablet (10 mg total) by mouth once daily. 90 tablet 3    aspirin (ECOTRIN) 81 MG EC tablet Take 81 mg by mouth once daily.      folic acid (FOLVITE) 1 MG tablet Take 1,000 mcg by mouth.      loratadine (CLARITIN) 10 mg tablet Take 10 mg by mouth once daily.      meloxicam (MOBIC) 15 MG tablet Take 1 tablet (15 mg total) by mouth daily as needed for Pain. 90 tablet 3    ondansetron (ZOFRAN) 4 MG tablet       HYDROcodone-acetaminophen (NORCO) 5-325 mg per tablet Take 1 tablet by mouth every 6 (six) hours as needed for pain.... May cause drowsiness (Patient not taking: Reported on 4/22/2025) 12 tablet 0     No facility-administered encounter medications on file as of 4/22/2025.       Assessment & Plan    As above                                          Orders Placed This Encounter   Procedures    X-Ray Chest PA And Lateral     Standing Status:   Future     Number of Occurrences:   1     Expected Date:   4/22/2025     Expiration Date:   4/22/2026     May the Radiologist modify the order per protocol to meet the clinical needs of the patient?:   Yes     Release to patient:   Immediate

## 2025-07-31 ENCOUNTER — EXTERNAL CHRONIC CARE MANAGEMENT (OUTPATIENT)
Dept: INTERNAL MEDICINE | Facility: CLINIC | Age: 70
End: 2025-07-31
Payer: MEDICARE

## 2025-07-31 PROCEDURE — 99490 CHRNC CARE MGMT STAFF 1ST 20: CPT | Mod: PBBFAC | Performed by: INTERNAL MEDICINE

## 2025-07-31 PROCEDURE — 99490 CHRNC CARE MGMT STAFF 1ST 20: CPT | Mod: S$PBB,,, | Performed by: INTERNAL MEDICINE
